# Patient Record
Sex: MALE | Race: BLACK OR AFRICAN AMERICAN | NOT HISPANIC OR LATINO | Employment: FULL TIME | URBAN - METROPOLITAN AREA
[De-identification: names, ages, dates, MRNs, and addresses within clinical notes are randomized per-mention and may not be internally consistent; named-entity substitution may affect disease eponyms.]

---

## 2023-10-25 ENCOUNTER — HOSPITAL ENCOUNTER (EMERGENCY)
Facility: HOSPITAL | Age: 33
Discharge: HOME | End: 2023-10-25
Attending: STUDENT IN AN ORGANIZED HEALTH CARE EDUCATION/TRAINING PROGRAM
Payer: MEDICAID

## 2023-10-25 VITALS
HEART RATE: 64 BPM | OXYGEN SATURATION: 100 % | SYSTOLIC BLOOD PRESSURE: 119 MMHG | RESPIRATION RATE: 18 BRPM | DIASTOLIC BLOOD PRESSURE: 79 MMHG | HEIGHT: 75 IN | WEIGHT: 162.94 LBS | BODY MASS INDEX: 20.26 KG/M2 | TEMPERATURE: 97 F

## 2023-10-25 DIAGNOSIS — R05.1 ACUTE COUGH: ICD-10-CM

## 2023-10-25 DIAGNOSIS — J10.1 INFLUENZA A: Primary | ICD-10-CM

## 2023-10-25 LAB
FLUAV AG UPPER RESP QL IA.RAPID: DETECTED
FLUBV AG UPPER RESP QL IA.RAPID: NOT DETECTED
SARS-COV-2 RNA RESP QL NAA+PROBE: NOT DETECTED

## 2023-10-25 PROCEDURE — 0240U COVID/FLU A&B PCR: CPT | Performed by: PHYSICIAN ASSISTANT

## 2023-10-25 PROCEDURE — 99284 EMERGENCY DEPT VISIT MOD MDM: CPT

## 2023-10-25 RX ORDER — AZELASTINE 1 MG/ML
1 SPRAY, METERED NASAL 2 TIMES DAILY
Qty: 30 ML | Refills: 0 | Status: SHIPPED | OUTPATIENT
Start: 2023-10-25 | End: 2023-11-01

## 2023-10-25 RX ORDER — CETIRIZINE HYDROCHLORIDE 10 MG/1
10 TABLET ORAL DAILY
Qty: 14 TABLET | Refills: 0 | Status: SHIPPED | OUTPATIENT
Start: 2023-10-25 | End: 2023-11-08

## 2023-10-25 RX ORDER — PROMETHAZINE HYDROCHLORIDE AND DEXTROMETHORPHAN HYDROBROMIDE 6.25; 15 MG/5ML; MG/5ML
5 SYRUP ORAL EVERY 6 HOURS PRN
Qty: 100 ML | Refills: 0 | Status: SHIPPED | OUTPATIENT
Start: 2023-10-25 | End: 2023-10-30

## 2023-10-25 NOTE — ED PROVIDER NOTES
Encounter Date: 10/25/2023       History     Chief Complaint   Patient presents with    Influenza     Reports known exposure to flu. Vomiting, cough, runny nose, and HA since this AM.     32-year-old male reports to the emergency department with complaints of cough, runny nose, headache, nausea, vomiting, and generalized body aches that began yesterday.  He states he was recently exposed to a few people that tested positive for influenza.  Patient states he is still able to eat and drink.  He denies dizziness, chest pain, shortness of breath, diarrhea, sore throat, ear pain.    The history is provided by the patient. No  was used.     Review of patient's allergies indicates:  No Known Allergies  No past medical history on file.  No past surgical history on file.  No family history on file.     Review of Systems   Constitutional:  Negative for appetite change and fever.   HENT:  Positive for congestion and rhinorrhea. Negative for ear pain and sore throat.    Eyes: Negative.    Respiratory:  Positive for cough. Negative for shortness of breath.    Cardiovascular:  Negative for chest pain.   Gastrointestinal:  Positive for nausea and vomiting. Negative for abdominal pain.   Genitourinary:  Negative for dysuria.   Musculoskeletal:  Negative for back pain.   Skin:  Negative for rash.   Neurological:  Positive for headaches.       Physical Exam     Initial Vitals [10/25/23 0924]   BP Pulse Resp Temp SpO2   119/79 64 18 97 °F (36.1 °C) 100 %      MAP       --         Physical Exam    Nursing note and vitals reviewed.  Constitutional: He appears well-developed and well-nourished.   HENT:   Nose: Nose normal.   Mouth/Throat: Oropharynx is clear and moist.   Eyes: Conjunctivae and EOM are normal. Pupils are equal, round, and reactive to light.   Neck: Neck supple.   Normal range of motion.  Cardiovascular:  Normal rate, regular rhythm, normal heart sounds and intact distal pulses.            Pulmonary/Chest: Breath sounds normal. No respiratory distress. He has no wheezes. He has no rhonchi. He has no rales. He exhibits no tenderness.   Abdominal: Abdomen is soft. Bowel sounds are normal. There is no abdominal tenderness.   Musculoskeletal:         General: Normal range of motion.      Cervical back: Normal range of motion and neck supple.     Lymphadenopathy:     He has no cervical adenopathy.   Neurological: He is alert and oriented to person, place, and time. GCS score is 15. GCS eye subscore is 4. GCS verbal subscore is 5. GCS motor subscore is 6.   Skin: Skin is warm. Capillary refill takes less than 2 seconds.         ED Course   Procedures  Labs Reviewed   COVID/FLU A&B PCR - Abnormal; Notable for the following components:       Result Value    Influenza A PCR Detected (*)     All other components within normal limits    Narrative:     The Xpert Xpress SARS-CoV-2/FLU/RSV plus is a rapid, multiplexed real-time PCR test intended for the simultaneous qualitative detection and differentiation of SARS-CoV-2, Influenza A, Influenza B, and respiratory syncytial virus (RSV) viral RNA in either nasopharyngeal swab or nasal swab specimens.                Imaging Results    None          Medications - No data to display  Medical Decision Making  32-year-old male reports to the emergency department with complaints of cough, runny nose, headache, nausea, vomiting, and generalized body aches that began yesterday.  He states he was recently exposed to a few people that tested positive for influenza.  Patient states he is still able to eat and drink.  He denies dizziness, chest pain, shortness of breath, diarrhea, sore throat, ear pain.    DDx:  COVID, influenza, viral illness, seasonal allergies    Patient tested positive for influenza A.  He declined Tamiflu.  I prescribed cough syrup, azelastine nasal spray and cetirizine.    The patient is resting comfortably and in no acute distress.  I personally  discussed his test results and treatment plan.  Gave strict ED precautions and specific conditions for return to the emergency department and importance of follow up with pcp.  Patient voices understanding and agrees to the plan discussed. All patients' questions have been answered at this time. He has remained hemodynamically stable throughout entire stay in ED and is stable for discharge home.     Amount and/or Complexity of Data Reviewed  Labs: ordered. Decision-making details documented in ED Course.     Details: Influenza A+    Risk  OTC drugs.  Prescription drug management.         APC / Resident Notes:   I was not physically present during the history, exam or disposition of this patient. I was available at all times for consultation. (Zmora)        ED Course as of 10/25/23 1943   Wed Oct 25, 2023   1016 Influenza A, Molecular(!): Detected [ER]      ED Course User Index  [ER] Maria Fernanda Del Real PA                    Clinical Impression:   Final diagnoses:  [J10.1] Influenza A (Primary)  [R05.1] Acute cough        ED Disposition Condition    Discharge Stable          ED Prescriptions       Medication Sig Dispense Start Date End Date Auth. Provider    azelastine (ASTELIN) 137 mcg (0.1 %) nasal spray 1 spray (137 mcg total) by Nasal route 2 (two) times daily. for 7 days 30 mL 10/25/2023 11/1/2023 Maria Fernanda Del Real PA    cetirizine (ZYRTEC) 10 MG tablet Take 1 tablet (10 mg total) by mouth once daily. for 14 days 14 tablet 10/25/2023 11/8/2023 Maria Fernanda Del Real PA    promethazine-dextromethorphan (PROMETHAZINE-DM) 6.25-15 mg/5 mL Syrp Take 5 mLs by mouth every 6 (six) hours as needed (cough). 100 mL 10/25/2023 10/30/2023 Maria Fernanda Del Real PA          Follow-up Information       Follow up With Specialties Details Why Contact Info    [facility] - Emergency Dept Emergency Medicine  As needed, If symptoms worsen [address] [facility] [address] [phone]             Maria Fernanda Del Real PA  10/25/23  1757       Moustapha Thornton MD  10/25/23 1943

## 2024-06-25 ENCOUNTER — HOSPITAL ENCOUNTER (OUTPATIENT)
Facility: HOSPITAL | Age: 34
Discharge: HOME | End: 2024-06-27
Attending: STUDENT IN AN ORGANIZED HEALTH CARE EDUCATION/TRAINING PROGRAM | Admitting: SURGERY
Payer: MEDICAID

## 2024-06-25 DIAGNOSIS — K35.30 ACUTE APPENDICITIS WITH LOCALIZED PERITONITIS, WITHOUT PERFORATION, ABSCESS, OR GANGRENE: Primary | ICD-10-CM

## 2024-06-25 LAB
ALBUMIN SERPL-MCNC: 4.6 G/DL (ref 3.5–5)
ALBUMIN/GLOB SERPL: 1.4 RATIO (ref 1.1–2)
ALP SERPL-CCNC: 66 UNIT/L (ref 40–150)
ALT SERPL-CCNC: 13 UNIT/L (ref 0–55)
ANION GAP SERPL CALC-SCNC: 13 MEQ/L
AST SERPL-CCNC: 25 UNIT/L (ref 5–34)
BACTERIA #/AREA URNS AUTO: ABNORMAL /HPF
BASOPHILS # BLD AUTO: 0.03 X10(3)/MCL
BASOPHILS NFR BLD AUTO: 0.2 %
BILIRUB SERPL-MCNC: 2.7 MG/DL
BILIRUB UR QL STRIP.AUTO: NEGATIVE
BUN SERPL-MCNC: 15.6 MG/DL (ref 8.9–20.6)
CALCIUM SERPL-MCNC: 10.5 MG/DL (ref 8.4–10.2)
CHLORIDE SERPL-SCNC: 99 MMOL/L (ref 98–107)
CK SERPL-CCNC: 192 U/L (ref 30–200)
CLARITY UR: CLEAR
CO2 SERPL-SCNC: 24 MMOL/L (ref 22–29)
COLOR UR AUTO: ABNORMAL
CREAT SERPL-MCNC: 1.15 MG/DL (ref 0.73–1.18)
CREAT/UREA NIT SERPL: 14
EOSINOPHIL # BLD AUTO: 0.01 X10(3)/MCL (ref 0–0.9)
EOSINOPHIL NFR BLD AUTO: 0.1 %
ERYTHROCYTE [DISTWIDTH] IN BLOOD BY AUTOMATED COUNT: 11 % (ref 11.5–17)
GFR SERPLBLD CREATININE-BSD FMLA CKD-EPI: >60 ML/MIN/1.73/M2
GLOBULIN SER-MCNC: 3.4 GM/DL (ref 2.4–3.5)
GLUCOSE SERPL-MCNC: 103 MG/DL (ref 74–100)
GLUCOSE UR QL STRIP: NORMAL
HCT VFR BLD AUTO: 43.1 % (ref 42–52)
HGB BLD-MCNC: 15.5 G/DL (ref 14–18)
HGB UR QL STRIP: NEGATIVE
HYALINE CASTS #/AREA URNS LPF: ABNORMAL /LPF
IMM GRANULOCYTES # BLD AUTO: 0.03 X10(3)/MCL (ref 0–0.04)
IMM GRANULOCYTES NFR BLD AUTO: 0.2 %
KETONES UR QL STRIP: ABNORMAL
LEUKOCYTE ESTERASE UR QL STRIP: 25
LIPASE SERPL-CCNC: 12 U/L
LYMPHOCYTES # BLD AUTO: 0.42 X10(3)/MCL (ref 0.6–4.6)
LYMPHOCYTES NFR BLD AUTO: 3.4 %
MCH RBC QN AUTO: 33.8 PG (ref 27–31)
MCHC RBC AUTO-ENTMCNC: 36 G/DL (ref 33–36)
MCV RBC AUTO: 94.1 FL (ref 80–94)
MONOCYTES # BLD AUTO: 0.35 X10(3)/MCL (ref 0.1–1.3)
MONOCYTES NFR BLD AUTO: 2.9 %
MUCOUS THREADS URNS QL MICRO: ABNORMAL /LPF
NEUTROPHILS # BLD AUTO: 11.4 X10(3)/MCL (ref 2.1–9.2)
NEUTROPHILS NFR BLD AUTO: 93.2 %
NITRITE UR QL STRIP: NEGATIVE
NRBC BLD AUTO-RTO: 0 %
PH UR STRIP: 6 [PH]
PLATELET # BLD AUTO: 192 X10(3)/MCL (ref 130–400)
PLATELETS.RETICULATED NFR BLD AUTO: 6.2 % (ref 0.9–11.2)
PMV BLD AUTO: 11.4 FL (ref 7.4–10.4)
POTASSIUM SERPL-SCNC: 3.5 MMOL/L (ref 3.5–5.1)
PROT SERPL-MCNC: 8 GM/DL (ref 6.4–8.3)
PROT UR QL STRIP: ABNORMAL
RBC # BLD AUTO: 4.58 X10(6)/MCL (ref 4.7–6.1)
RBC #/AREA URNS AUTO: ABNORMAL /HPF
SODIUM SERPL-SCNC: 136 MMOL/L (ref 136–145)
SP GR UR STRIP.AUTO: 1.04 (ref 1–1.03)
SQUAMOUS #/AREA URNS LPF: ABNORMAL /HPF
UROBILINOGEN UR STRIP-ACNC: ABNORMAL
WBC # BLD AUTO: 12.24 X10(3)/MCL (ref 4.5–11.5)
WBC #/AREA URNS AUTO: ABNORMAL /HPF

## 2024-06-25 PROCEDURE — G0378 HOSPITAL OBSERVATION PER HR: HCPCS

## 2024-06-25 PROCEDURE — 87086 URINE CULTURE/COLONY COUNT: CPT | Performed by: PHYSICIAN ASSISTANT

## 2024-06-25 PROCEDURE — 96376 TX/PRO/DX INJ SAME DRUG ADON: CPT

## 2024-06-25 PROCEDURE — 85025 COMPLETE CBC W/AUTO DIFF WBC: CPT | Performed by: PHYSICIAN ASSISTANT

## 2024-06-25 PROCEDURE — 25000003 PHARM REV CODE 250: Performed by: PHYSICIAN ASSISTANT

## 2024-06-25 PROCEDURE — 25500020 PHARM REV CODE 255: Performed by: PHYSICIAN ASSISTANT

## 2024-06-25 PROCEDURE — 63600175 PHARM REV CODE 636 W HCPCS: Performed by: STUDENT IN AN ORGANIZED HEALTH CARE EDUCATION/TRAINING PROGRAM

## 2024-06-25 PROCEDURE — 25000003 PHARM REV CODE 250

## 2024-06-25 PROCEDURE — 63600175 PHARM REV CODE 636 W HCPCS: Performed by: PHYSICIAN ASSISTANT

## 2024-06-25 PROCEDURE — 96365 THER/PROPH/DIAG IV INF INIT: CPT

## 2024-06-25 PROCEDURE — 96361 HYDRATE IV INFUSION ADD-ON: CPT

## 2024-06-25 PROCEDURE — 96366 THER/PROPH/DIAG IV INF ADDON: CPT

## 2024-06-25 PROCEDURE — 96375 TX/PRO/DX INJ NEW DRUG ADDON: CPT

## 2024-06-25 PROCEDURE — 80053 COMPREHEN METABOLIC PANEL: CPT | Performed by: PHYSICIAN ASSISTANT

## 2024-06-25 PROCEDURE — 83690 ASSAY OF LIPASE: CPT | Performed by: PHYSICIAN ASSISTANT

## 2024-06-25 PROCEDURE — 63600175 PHARM REV CODE 636 W HCPCS

## 2024-06-25 PROCEDURE — 96372 THER/PROPH/DIAG INJ SC/IM: CPT | Mod: 59

## 2024-06-25 PROCEDURE — 82550 ASSAY OF CK (CPK): CPT | Performed by: PHYSICIAN ASSISTANT

## 2024-06-25 PROCEDURE — 99285 EMERGENCY DEPT VISIT HI MDM: CPT | Mod: 25

## 2024-06-25 PROCEDURE — 81015 MICROSCOPIC EXAM OF URINE: CPT | Performed by: PHYSICIAN ASSISTANT

## 2024-06-25 PROCEDURE — 81001 URINALYSIS AUTO W/SCOPE: CPT | Performed by: PHYSICIAN ASSISTANT

## 2024-06-25 RX ORDER — SODIUM CHLORIDE, SODIUM LACTATE, POTASSIUM CHLORIDE, CALCIUM CHLORIDE 600; 310; 30; 20 MG/100ML; MG/100ML; MG/100ML; MG/100ML
INJECTION, SOLUTION INTRAVENOUS CONTINUOUS
Status: DISCONTINUED | OUTPATIENT
Start: 2024-06-25 | End: 2024-06-27

## 2024-06-25 RX ORDER — ACETAMINOPHEN 325 MG/1
650 TABLET ORAL EVERY 8 HOURS PRN
Status: DISCONTINUED | OUTPATIENT
Start: 2024-06-25 | End: 2024-06-27 | Stop reason: HOSPADM

## 2024-06-25 RX ORDER — HYDRALAZINE HYDROCHLORIDE 20 MG/ML
10 INJECTION INTRAMUSCULAR; INTRAVENOUS EVERY 6 HOURS PRN
Status: DISCONTINUED | OUTPATIENT
Start: 2024-06-25 | End: 2024-06-27 | Stop reason: HOSPADM

## 2024-06-25 RX ORDER — ONDANSETRON HYDROCHLORIDE 2 MG/ML
8 INJECTION, SOLUTION INTRAVENOUS EVERY 8 HOURS PRN
Status: DISCONTINUED | OUTPATIENT
Start: 2024-06-25 | End: 2024-06-27 | Stop reason: HOSPADM

## 2024-06-25 RX ORDER — OXYCODONE HYDROCHLORIDE 5 MG/1
5 TABLET ORAL EVERY 4 HOURS PRN
Status: DISCONTINUED | OUTPATIENT
Start: 2024-06-25 | End: 2024-06-27 | Stop reason: HOSPADM

## 2024-06-25 RX ORDER — IBUPROFEN 200 MG
1 TABLET ORAL DAILY PRN
Status: DISCONTINUED | OUTPATIENT
Start: 2024-06-25 | End: 2024-06-27 | Stop reason: HOSPADM

## 2024-06-25 RX ORDER — OXYCODONE HYDROCHLORIDE 5 MG/1
10 TABLET ORAL EVERY 4 HOURS PRN
Status: DISCONTINUED | OUTPATIENT
Start: 2024-06-25 | End: 2024-06-27 | Stop reason: HOSPADM

## 2024-06-25 RX ORDER — LIDOCAINE HYDROCHLORIDE 10 MG/ML
1 INJECTION, SOLUTION EPIDURAL; INFILTRATION; INTRACAUDAL; PERINEURAL ONCE
Status: DISCONTINUED | OUTPATIENT
Start: 2024-06-25 | End: 2024-06-27 | Stop reason: HOSPADM

## 2024-06-25 RX ORDER — TALC
6 POWDER (GRAM) TOPICAL NIGHTLY PRN
Status: DISCONTINUED | OUTPATIENT
Start: 2024-06-25 | End: 2024-06-27 | Stop reason: HOSPADM

## 2024-06-25 RX ORDER — ACETAMINOPHEN 325 MG/1
650 TABLET ORAL EVERY 6 HOURS
Status: DISCONTINUED | OUTPATIENT
Start: 2024-06-25 | End: 2024-06-27

## 2024-06-25 RX ORDER — ENOXAPARIN SODIUM 100 MG/ML
40 INJECTION SUBCUTANEOUS EVERY 24 HOURS
Status: DISCONTINUED | OUTPATIENT
Start: 2024-06-25 | End: 2024-06-27 | Stop reason: HOSPADM

## 2024-06-25 RX ORDER — ONDANSETRON HYDROCHLORIDE 2 MG/ML
4 INJECTION, SOLUTION INTRAVENOUS
Status: COMPLETED | OUTPATIENT
Start: 2024-06-25 | End: 2024-06-25

## 2024-06-25 RX ORDER — KETOROLAC TROMETHAMINE 30 MG/ML
15 INJECTION, SOLUTION INTRAMUSCULAR; INTRAVENOUS
Status: COMPLETED | OUTPATIENT
Start: 2024-06-25 | End: 2024-06-25

## 2024-06-25 RX ADMIN — PIPERACILLIN AND TAZOBACTAM 4.5 G: 4; .5 INJECTION, POWDER, LYOPHILIZED, FOR SOLUTION INTRAVENOUS; PARENTERAL at 08:06

## 2024-06-25 RX ADMIN — ONDANSETRON 8 MG: 2 INJECTION INTRAMUSCULAR; INTRAVENOUS at 10:06

## 2024-06-25 RX ADMIN — SODIUM CHLORIDE, POTASSIUM CHLORIDE, SODIUM LACTATE AND CALCIUM CHLORIDE: 600; 310; 30; 20 INJECTION, SOLUTION INTRAVENOUS at 06:06

## 2024-06-25 RX ADMIN — ENOXAPARIN SODIUM 40 MG: 40 INJECTION SUBCUTANEOUS at 08:06

## 2024-06-25 RX ADMIN — SODIUM CHLORIDE, POTASSIUM CHLORIDE, SODIUM LACTATE AND CALCIUM CHLORIDE 1000 ML: 600; 310; 30; 20 INJECTION, SOLUTION INTRAVENOUS at 11:06

## 2024-06-25 RX ADMIN — PIPERACILLIN SODIUM AND TAZOBACTAM SODIUM 4.5 G: 4; .5 INJECTION, POWDER, LYOPHILIZED, FOR SOLUTION INTRAVENOUS at 04:06

## 2024-06-25 RX ADMIN — ACETAMINOPHEN 650 MG: 325 TABLET, FILM COATED ORAL at 06:06

## 2024-06-25 RX ADMIN — IOHEXOL 100 ML: 350 INJECTION, SOLUTION INTRAVENOUS at 03:06

## 2024-06-25 RX ADMIN — ONDANSETRON 4 MG: 2 INJECTION INTRAMUSCULAR; INTRAVENOUS at 11:06

## 2024-06-25 RX ADMIN — KETOROLAC TROMETHAMINE 15 MG: 30 INJECTION, SOLUTION INTRAMUSCULAR; INTRAVENOUS at 04:06

## 2024-06-25 NOTE — ED PROVIDER NOTES
Encounter Date: 6/25/2024       History     Chief Complaint   Patient presents with    Abdominal Pain     PT W CO ABD PAIN W NV AND CONSTIPATION SINCE YESTERDAY AFTER MOWING GRASS.  PT ACTIVELY VOMITING IN TRIAGE.       34 yo male presents to to ED with N/V, abdominal pain, and constipation for the past 3 days. Patient states it worsened after mowing the grass yesterday. Patient states he has vomited over 8 times. Abdominal pain is generalized, but mostly epigastric. Admits marijuana usage, last use yesterday. Denies PMHx. Patient states the pain is constant and sharp. Last BM 3 days ago.     The history is provided by the patient. No  was used.     Review of patient's allergies indicates:  No Known Allergies  History reviewed. No pertinent past medical history.  History reviewed. No pertinent surgical history.  No family history on file.  Social History     Tobacco Use    Smoking status: Every Day     Types: Cigarettes    Smokeless tobacco: Never   Substance Use Topics    Alcohol use: Yes    Drug use: Yes     Types: Marijuana     Review of Systems   Constitutional:  Negative for chills, fatigue and fever.   HENT:  Negative for congestion, ear pain, sinus pain and sore throat.    Eyes:  Negative for pain.   Respiratory:  Negative for cough, chest tightness and shortness of breath.    Cardiovascular:  Negative for chest pain.   Gastrointestinal:  Positive for abdominal pain, constipation, nausea and vomiting. Negative for abdominal distention, blood in stool and diarrhea.   Genitourinary:  Negative for dysuria.   Musculoskeletal:  Negative for back pain and joint swelling.   Skin:  Negative for color change and rash.   Neurological:  Negative for dizziness and weakness.   Psychiatric/Behavioral:  Negative for behavioral problems and confusion.    All other systems reviewed and are negative.      Physical Exam     Initial Vitals [06/25/24 1058]   BP Pulse Resp Temp SpO2   126/60 102 (!) 22 99.5 °F  (37.5 °C) 100 %      MAP       --         Physical Exam    Nursing note and vitals reviewed.  Constitutional: He appears well-developed and well-nourished.   HENT:   Head: Normocephalic and atraumatic.   Right Ear: External ear normal.   Left Ear: External ear normal.   Nose: Nose normal.   Eyes: Conjunctivae and EOM are normal.   Neck: Neck supple. No thyromegaly present. No tracheal deviation present.   Cardiovascular:  Normal rate, regular rhythm and normal heart sounds.     Exam reveals no gallop and no friction rub.       No murmur heard.  Pulmonary/Chest: Breath sounds normal. No respiratory distress. He has no wheezes. He has no rhonchi. He has no rales. He exhibits no tenderness.   Abdominal: Abdomen is soft. Bowel sounds are normal. He exhibits no distension and no mass. There is abdominal tenderness (epigastric). There is no rebound and no guarding.   Musculoskeletal:      Cervical back: Neck supple.     Neurological: He is alert and oriented to person, place, and time. GCS score is 15. GCS eye subscore is 4. GCS verbal subscore is 5. GCS motor subscore is 6.   Skin: Skin is warm. Capillary refill takes less than 2 seconds. No rash and no abscess noted. No erythema. No pallor.   Psychiatric: He has a normal mood and affect.         ED Course   Procedures  Labs Reviewed   COMPREHENSIVE METABOLIC PANEL - Abnormal; Notable for the following components:       Result Value    Glucose 103 (*)     Calcium 10.5 (*)     Bilirubin Total 2.7 (*)     All other components within normal limits   URINALYSIS, REFLEX TO URINE CULTURE - Abnormal; Notable for the following components:    Color, UA Light-Orange (*)     Specific Gravity, UA 1.042 (*)     Protein, UA 1+ (*)     Ketones, UA 3+ (*)     Urobilinogen, UA 3+ (*)     Leukocyte Esterase, UA 25 (*)     WBC, UA 11-20 (*)     Bacteria, UA Trace (*)     Mucous, UA Few (*)     All other components within normal limits   CBC WITH DIFFERENTIAL - Abnormal; Notable for the  following components:    WBC 12.24 (*)     RBC 4.58 (*)     MCV 94.1 (*)     MCH 33.8 (*)     RDW 11.0 (*)     MPV 11.4 (*)     Lymph # 0.42 (*)     Neut # 11.40 (*)     All other components within normal limits   CK - Normal   LIPASE - Normal   CULTURE, URINE   CBC W/ AUTO DIFFERENTIAL    Narrative:     The following orders were created for panel order CBC auto differential.  Procedure                               Abnormality         Status                     ---------                               -----------         ------                     CBC with Differential[[id number]]       Abnormal            Final result                 Please view results for these tests on the individual orders.          Imaging Results              CT Abdomen Pelvis With IV Contrast NO Oral Contrast (Final result)  Result time 06/25/24 15:16:33      Final result by Jerilyn Arellano MD (06/25/24 15:16:33)                   Impression:      Findings suggestive of early acute appendicitis.  No drainable fluid collection or free air.      Electronically signed by: Jerilyn Arellano  Date:    06/25/2024  Time:    15:16               Narrative:    EXAMINATION:  CT ABDOMEN PELVIS WITH IV CONTRAST    CLINICAL HISTORY:  RLQ abdominal pain (Age >= 14y);    TECHNIQUE:  CT imaging was performed of the abdomen and pelvis after the administration of intravenous contrast. Dose length product is 425 mGycm. Automatic exposure control, adjustment of mA/kV or iterative reconstruction technique was used to limit radiation dose.    COMPARISON:  None    FINDINGS:  Liver: Normal.    Gallbladder and biliary tree: No calcified gallstones. No intra or extrahepatic biliary ductal dilation.    Pancreas: Normal.    Spleen: Normal.    Adrenals: Normal.    Kidneys and ureters: Normal.    Bladder: Normal.    Reproductive organs: No pelvic masses.    Stomach/bowel: No evidence of bowel obstruction. The appendix is dilated measuring up to 9 mm in diameter, with  mild surrounding inflammatory changes.    Lymph nodes: No pathologically enlarged lymph node identified.    Peritoneum: No ascites or free air. No fluid collection.    Vessels: No abdominal aortic aneurysm.    Abdominal wall: Normal.    Lung bases: No consolidation or pleural effusion.    Bones: No acute osseous findings.                                       X-Ray Abdomen Flat And Erect (Final result)  Result time 06/25/24 12:11:56      Final result by Po Ramey MD (06/25/24 12:11:56)                   Impression:      No acute radiographic findings.      Electronically signed by: Po Ramey  Date:    06/25/2024  Time:    12:11               Narrative:    EXAMINATION:  XR ABDOMEN FLAT AND ERECT    CLINICAL HISTORY:  abdominal pain, constipation;    COMPARISON:  None    FINDINGS:  Flat and upright views of the abdomen.  There is a nonspecific, nonobstructive bowel gas pattern.  No large stool burden no free air.                                       Medications   piperacillin-tazobactam (ZOSYN) 4.5 g in D5W 100 mL IVPB (MB+) (has no administration in time range)   ketorolac injection 15 mg (has no administration in time range)   lactated ringers bolus 1,000 mL (0 mLs Intravenous Stopped 6/25/24 1238)   ondansetron injection 4 mg (4 mg Intravenous Given 6/25/24 1137)   iohexoL (OMNIPAQUE 350) injection 100 mL (100 mLs Intravenous Given 6/25/24 1506)     Medical Decision Making  34 yo male presents to to ED with N/V, abdominal pain, and constipation for the past 3 days. Patient states it worsened after mowing the grass yesterday. Patient states he has vomited over 8 times. Abdominal pain is generalized, but mostly epigastric. Admits marijuana usage, last use yesterday. Denies PMHx. Patient states the pain is constant and sharp. Last BM 3 days ago.     DDX: constipation, bowel obstruction, pancreatitis, hyperemesis cannabis, among others    Hospital course: Labs, abdominal x-ray, and UA ordered. Zofran and  fluids given in ED. CBC remarkable for elevated wbc of 12. Will order CT abdomen/pelvis to rule out appendicitis. CMP unremarkable, lipase wnl. UA suggests dehydration, fluids given in ED. CT with early acute appendicitis. Zosyn IV given along with Zofran and Toradol. Discussed this case with Dr. Longo. Will admit to general surgery.       Amount and/or Complexity of Data Reviewed  Labs: ordered. Decision-making details documented in ED Course.  Radiology: ordered.  Discussion of management or test interpretation with external provider(s): Discussed case with Dr. Mendoza (general surgery) who will come to the ED for evaluation and admission.     Risk  Prescription drug management.               ED Course as of 06/25/24 1533   Tue Jun 25, 2024   1314 BILIRUBIN TOTAL(!): 2.7 [VH]   1533 WBC(!): 12.24 [VH]      ED Course User Index  [VH] Era Sawant PA-C                           Clinical Impression:  Final diagnoses:  [K35.30] Acute appendicitis with localized peritonitis, without perforation, abscess, or gangrene (Primary)          ED Disposition Condition    Admit Stable                Era Sawant PA-C  06/25/24 1533

## 2024-06-25 NOTE — H&P
[facility] Team  Admission H&P     CC: Abdominal pain     Subjective:  HPI: Patient is a 33M with no significant PMH who presents today with abdominal pain present for the last 3-4 days. Associated with subjective fevers, nausea, vomiting, and constipation. Denies ever having pain like this before. States the pain is primarily located periumbilical/RLQ, but has migrated to primarily involve the RLQ over the past day or so. Last BM was yesterday morning, which he reports as being small. Having darker urine than normal but attributes this to not being able to tolerate PO hydration. On arrival to ED, he is afebrile and hemodynamically stable with normal HR and BP. Labs notable for leukocytosis of 12.2 and UA with findings consistent with mild UTI. CT abdomen/pelvis significant for dilation of the appendix with surrounding inflammatory changes, concerning for acute early appendicitis. General surgery consulted for evaluation.     Approximately 1/2 ppd daily for the past 5 years. Drinks socially. Endorses marijuana occasionally.     ROS negative other than those noted in the HPI above.     PMH:  History reviewed. No pertinent past medical history.      PSH:  History reviewed. No pertinent surgical history. Reports umbilical hernia repair when he was younger than 1 years of age but surgeries since.      Medications:  No current facility-administered medications on file prior to encounter.     Current Outpatient Medications on File Prior to Encounter   Medication Sig Dispense Refill    azelastine (ASTELIN) 137 mcg (0.1 %) nasal spray 1 spray (137 mcg total) by Nasal route 2 (two) times daily. for 7 days 30 mL 0    cetirizine (ZYRTEC) 10 MG tablet Take 1 tablet (10 mg total) by mouth once daily. for 14 days 14 tablet 0      Allergies:  Review of patient's allergies indicates:  No Known Allergies      Social Hx:  Social History     Tobacco Use   Smoking Status Every Day    Types: Cigarettes   Smokeless  "Tobacco Never      Social History     Substance and Sexual Activity   Alcohol Use Yes      Relevant Family Hx:  No family history on file.      Objective:  Vitals:  VITAL SIGNS: 24 HR MIN & MAX LAST   Temp  Min: 99.5 °F (37.5 °C)  Max: 99.5 °F (37.5 °C)  99.5 °F (37.5 °C)   BP  Min: 126/60  Max: 142/80  (!) 142/80    Pulse  Min: 78  Max: 102  78    Resp  Min: 22  Max: 22  (!) 22    SpO2  Min: 97 %  Max: 100 %  99 %      HT:    WT: 73.4 kg (161 lb 13.1 oz)  BMI:        Physical Exam:  Gen: NAD  Neuro: awake, alert, answering questions appropriately  CV: RR on RP  Resp: non-labored breathing, JOE  Abd: soft, ND. Significant TTP to RLQ and periumbilical region. No rebound or guarding. Positive McBurney's sign.   : deferred  Ext: moves all 4 spontaneously and purposefully  Skin: warm, well perfused     Labs:  Renal:  Recent Labs     06/25/24  1210   BUN 15.6   CREATININE 1.15     No results for input(s): "LACTIC" in the last 72 hours.  FENGI:  Recent Labs     06/25/24  1210      K 3.5   CL 99   CO2 24   CALCIUM 10.5*   ALBUMIN 4.6   BILITOT 2.7*   AST 25   ALKPHOS 66   ALT 13     Heme:  Recent Labs     06/25/24  1210   HGB 15.5   HCT 43.1        ID:  Recent Labs     06/25/24  1210   WBC 12.24*     CBG:  Recent Labs     06/25/24  1210   GLUCOSE 103*      Cardiovascular:  No results for input(s): "TROPONINI", "CKTOTAL", "CKMB", "BNP" in the last 168 hours.  I have reviewed all pertinent lab results within the past 24 hours.     Imaging:  CT Abdomen/Pelvis 6/25:  Impression:  The appendix is dilated measuring up to 9 mm in diameter, with mild surrounding inflammatory changes, findings suggestive of early acute appendicitis. No drainable fluid collection or free air.     Assessment/Plan:   Patient is a 33M with no significant PMH who presents today with abdominal pain present for the last 3-4 days associated with subjective fevers, nausea, vomiting, and constipation. Clinical and radiographic findings " consistent with early acute appendicitis.     - Admit to general surgery service  - OR 6/26 for laparoscopic appendectomy. After discussing risks, benefits, and alternatives to surgery, he elects to proceed. All questions and concerns addressed. Consent obtained.   - Okay for CLD today. NPO with mIVF at midnight. Okay for sips with medications.   - Zosyn  - Scheduled tylenol; oxycodone PRN. Will broaden regimen if needed.   - Antiemetics PRN  - Encourage ambulation/OOB  - Lovenox for DVT ppx    Please call surgery with any acute changes in clinical exam and/or hemodynamic status.     Becky Mendoza, DO  [facility], PGY1  06/25/2024 5:02 PM

## 2024-06-25 NOTE — LETTER
June 27, 2024         [address] [facility] [address]  Phone: [phone]  Fax: [phone]       Patient: Brian Peacock   YOB: 1990  Date of Visit: 06/27/2024    To Whom It May Concern:    Noel Peacock  was at [facility] from 6/25/2024 to 06/27/2024. The patient may return to work on 07/08/2024 with restrictions of no heavy lifting greater than 10 lbs for 6 weeks and mandatory breaks as needed.  The patient may return to work on 08/05/2024 with no restrictions. If you have any questions or concerns, or if I can be of further assistance, please do not hesitate to contact me.    Sincerely,    Janett Zavala RN

## 2024-06-26 ENCOUNTER — ANESTHESIA EVENT (OUTPATIENT)
Dept: SURGERY | Facility: HOSPITAL | Age: 34
End: 2024-06-26
Payer: MEDICAID

## 2024-06-26 ENCOUNTER — ANESTHESIA (OUTPATIENT)
Dept: SURGERY | Facility: HOSPITAL | Age: 34
End: 2024-06-26
Payer: MEDICAID

## 2024-06-26 LAB
ALBUMIN SERPL-MCNC: 3.4 G/DL (ref 3.5–5)
ALBUMIN/GLOB SERPL: 1.2 RATIO (ref 1.1–2)
ALP SERPL-CCNC: 68 UNIT/L (ref 40–150)
ALT SERPL-CCNC: 96 UNIT/L (ref 0–55)
ANION GAP SERPL CALC-SCNC: 5 MEQ/L
AST SERPL-CCNC: 141 UNIT/L (ref 5–34)
BASOPHILS # BLD AUTO: 0.01 X10(3)/MCL
BASOPHILS NFR BLD AUTO: 0.1 %
BILIRUB SERPL-MCNC: 1.4 MG/DL
BUN SERPL-MCNC: 12.7 MG/DL (ref 8.9–20.6)
CALCIUM SERPL-MCNC: 9.3 MG/DL (ref 8.4–10.2)
CHLORIDE SERPL-SCNC: 101 MMOL/L (ref 98–107)
CO2 SERPL-SCNC: 28 MMOL/L (ref 22–29)
CREAT SERPL-MCNC: 1.14 MG/DL (ref 0.73–1.18)
CREAT/UREA NIT SERPL: 11
EOSINOPHIL # BLD AUTO: 0 X10(3)/MCL (ref 0–0.9)
EOSINOPHIL NFR BLD AUTO: 0 %
ERYTHROCYTE [DISTWIDTH] IN BLOOD BY AUTOMATED COUNT: 11 % (ref 11.5–17)
GFR SERPLBLD CREATININE-BSD FMLA CKD-EPI: >60 ML/MIN/1.73/M2
GLOBULIN SER-MCNC: 2.8 GM/DL (ref 2.4–3.5)
GLUCOSE SERPL-MCNC: 120 MG/DL (ref 74–100)
HCT VFR BLD AUTO: 37.9 % (ref 42–52)
HGB BLD-MCNC: 13.5 G/DL (ref 14–18)
HOLD SPECIMEN: NORMAL
IMM GRANULOCYTES # BLD AUTO: 0.02 X10(3)/MCL (ref 0–0.04)
IMM GRANULOCYTES NFR BLD AUTO: 0.3 %
LYMPHOCYTES # BLD AUTO: 0.29 X10(3)/MCL (ref 0.6–4.6)
LYMPHOCYTES NFR BLD AUTO: 3.8 %
MAGNESIUM SERPL-MCNC: 1.7 MG/DL (ref 1.6–2.6)
MCH RBC QN AUTO: 34.3 PG (ref 27–31)
MCHC RBC AUTO-ENTMCNC: 35.6 G/DL (ref 33–36)
MCV RBC AUTO: 96.2 FL (ref 80–94)
MONOCYTES # BLD AUTO: 0.38 X10(3)/MCL (ref 0.1–1.3)
MONOCYTES NFR BLD AUTO: 4.9 %
NEUTROPHILS # BLD AUTO: 7.02 X10(3)/MCL (ref 2.1–9.2)
NEUTROPHILS NFR BLD AUTO: 90.9 %
NRBC BLD AUTO-RTO: 0 %
PHOSPHATE SERPL-MCNC: 3.1 MG/DL (ref 2.3–4.7)
PLATELET # BLD AUTO: 169 X10(3)/MCL (ref 130–400)
PMV BLD AUTO: 11.5 FL (ref 7.4–10.4)
POTASSIUM SERPL-SCNC: 3.5 MMOL/L (ref 3.5–5.1)
PROT SERPL-MCNC: 6.2 GM/DL (ref 6.4–8.3)
RBC # BLD AUTO: 3.94 X10(6)/MCL (ref 4.7–6.1)
SODIUM SERPL-SCNC: 134 MMOL/L (ref 136–145)
WBC # BLD AUTO: 7.72 X10(3)/MCL (ref 4.5–11.5)

## 2024-06-26 PROCEDURE — 25000003 PHARM REV CODE 250

## 2024-06-26 PROCEDURE — 71000033 HC RECOVERY, INTIAL HOUR: Performed by: STUDENT IN AN ORGANIZED HEALTH CARE EDUCATION/TRAINING PROGRAM

## 2024-06-26 PROCEDURE — 63600175 PHARM REV CODE 636 W HCPCS

## 2024-06-26 PROCEDURE — 96375 TX/PRO/DX INJ NEW DRUG ADDON: CPT

## 2024-06-26 PROCEDURE — 84100 ASSAY OF PHOSPHORUS: CPT

## 2024-06-26 PROCEDURE — 88304 TISSUE EXAM BY PATHOLOGIST: CPT | Mod: TC | Performed by: STUDENT IN AN ORGANIZED HEALTH CARE EDUCATION/TRAINING PROGRAM

## 2024-06-26 PROCEDURE — 37000009 HC ANESTHESIA EA ADD 15 MINS: Performed by: STUDENT IN AN ORGANIZED HEALTH CARE EDUCATION/TRAINING PROGRAM

## 2024-06-26 PROCEDURE — 36000709 HC OR TIME LEV III EA ADD 15 MIN: Performed by: STUDENT IN AN ORGANIZED HEALTH CARE EDUCATION/TRAINING PROGRAM

## 2024-06-26 PROCEDURE — 63600175 PHARM REV CODE 636 W HCPCS: Performed by: ANESTHESIOLOGY

## 2024-06-26 PROCEDURE — 94761 N-INVAS EAR/PLS OXIMETRY MLT: CPT

## 2024-06-26 PROCEDURE — 96361 HYDRATE IV INFUSION ADD-ON: CPT

## 2024-06-26 PROCEDURE — 36415 COLL VENOUS BLD VENIPUNCTURE: CPT

## 2024-06-26 PROCEDURE — 96372 THER/PROPH/DIAG INJ SC/IM: CPT

## 2024-06-26 PROCEDURE — 83735 ASSAY OF MAGNESIUM: CPT

## 2024-06-26 PROCEDURE — 85025 COMPLETE CBC W/AUTO DIFF WBC: CPT

## 2024-06-26 PROCEDURE — 25000003 PHARM REV CODE 250: Performed by: STUDENT IN AN ORGANIZED HEALTH CARE EDUCATION/TRAINING PROGRAM

## 2024-06-26 PROCEDURE — 37000008 HC ANESTHESIA 1ST 15 MINUTES: Performed by: STUDENT IN AN ORGANIZED HEALTH CARE EDUCATION/TRAINING PROGRAM

## 2024-06-26 PROCEDURE — 25000003 PHARM REV CODE 250: Performed by: NURSE ANESTHETIST, CERTIFIED REGISTERED

## 2024-06-26 PROCEDURE — G0378 HOSPITAL OBSERVATION PER HR: HCPCS

## 2024-06-26 PROCEDURE — 36000708 HC OR TIME LEV III 1ST 15 MIN: Performed by: STUDENT IN AN ORGANIZED HEALTH CARE EDUCATION/TRAINING PROGRAM

## 2024-06-26 PROCEDURE — 96366 THER/PROPH/DIAG IV INF ADDON: CPT

## 2024-06-26 PROCEDURE — 80053 COMPREHEN METABOLIC PANEL: CPT

## 2024-06-26 PROCEDURE — 63600175 PHARM REV CODE 636 W HCPCS: Performed by: NURSE ANESTHETIST, CERTIFIED REGISTERED

## 2024-06-26 PROCEDURE — 27201423 OPTIME MED/SURG SUP & DEVICES STERILE SUPPLY: Performed by: STUDENT IN AN ORGANIZED HEALTH CARE EDUCATION/TRAINING PROGRAM

## 2024-06-26 RX ORDER — SODIUM CHLORIDE, SODIUM LACTATE, POTASSIUM CHLORIDE, CALCIUM CHLORIDE 600; 310; 30; 20 MG/100ML; MG/100ML; MG/100ML; MG/100ML
INJECTION, SOLUTION INTRAVENOUS CONTINUOUS
Status: DISCONTINUED | OUTPATIENT
Start: 2024-06-26 | End: 2024-06-27

## 2024-06-26 RX ORDER — MIDAZOLAM HYDROCHLORIDE 2 MG/2ML
2 INJECTION, SOLUTION INTRAMUSCULAR; INTRAVENOUS ONCE AS NEEDED
Status: COMPLETED | OUTPATIENT
Start: 2024-06-26 | End: 2024-06-26

## 2024-06-26 RX ORDER — OXYCODONE HYDROCHLORIDE 5 MG/1
5 TABLET ORAL
Status: DISCONTINUED | OUTPATIENT
Start: 2024-06-26 | End: 2024-06-26

## 2024-06-26 RX ORDER — DEXMEDETOMIDINE HYDROCHLORIDE 100 UG/ML
INJECTION, SOLUTION INTRAVENOUS
Status: DISCONTINUED | OUTPATIENT
Start: 2024-06-26 | End: 2024-06-26

## 2024-06-26 RX ORDER — CEFAZOLIN SODIUM 1 G/3ML
INJECTION, POWDER, FOR SOLUTION INTRAMUSCULAR; INTRAVENOUS
Status: DISCONTINUED | OUTPATIENT
Start: 2024-06-26 | End: 2024-06-26

## 2024-06-26 RX ORDER — ROCURONIUM BROMIDE 10 MG/ML
INJECTION, SOLUTION INTRAVENOUS
Status: DISCONTINUED | OUTPATIENT
Start: 2024-06-26 | End: 2024-06-26

## 2024-06-26 RX ORDER — DEXAMETHASONE SODIUM PHOSPHATE 4 MG/ML
INJECTION, SOLUTION INTRA-ARTICULAR; INTRALESIONAL; INTRAMUSCULAR; INTRAVENOUS; SOFT TISSUE
Status: DISCONTINUED | OUTPATIENT
Start: 2024-06-26 | End: 2024-06-26

## 2024-06-26 RX ORDER — MORPHINE SULFATE 2 MG/ML
2 INJECTION, SOLUTION INTRAMUSCULAR; INTRAVENOUS EVERY 5 MIN PRN
Status: DISCONTINUED | OUTPATIENT
Start: 2024-06-26 | End: 2024-06-26

## 2024-06-26 RX ORDER — LIDOCAINE HYDROCHLORIDE 20 MG/ML
INJECTION INTRAVENOUS
Status: DISCONTINUED | OUTPATIENT
Start: 2024-06-26 | End: 2024-06-26

## 2024-06-26 RX ORDER — FENTANYL CITRATE 50 UG/ML
INJECTION, SOLUTION INTRAMUSCULAR; INTRAVENOUS
Status: DISCONTINUED | OUTPATIENT
Start: 2024-06-26 | End: 2024-06-26

## 2024-06-26 RX ORDER — KETAMINE HCL IN 0.9 % NACL 50 MG/5 ML
SYRINGE (ML) INTRAVENOUS
Status: DISCONTINUED | OUTPATIENT
Start: 2024-06-26 | End: 2024-06-26

## 2024-06-26 RX ORDER — SODIUM CHLORIDE 0.9 % (FLUSH) 0.9 %
10 SYRINGE (ML) INJECTION
Status: DISCONTINUED | OUTPATIENT
Start: 2024-06-26 | End: 2024-06-27 | Stop reason: HOSPADM

## 2024-06-26 RX ORDER — ONDANSETRON HYDROCHLORIDE 2 MG/ML
4 INJECTION, SOLUTION INTRAVENOUS DAILY PRN
Status: DISCONTINUED | OUTPATIENT
Start: 2024-06-26 | End: 2024-06-26

## 2024-06-26 RX ORDER — KETOROLAC TROMETHAMINE 30 MG/ML
INJECTION, SOLUTION INTRAMUSCULAR; INTRAVENOUS
Status: DISCONTINUED | OUTPATIENT
Start: 2024-06-26 | End: 2024-06-26

## 2024-06-26 RX ORDER — ONDANSETRON HYDROCHLORIDE 2 MG/ML
INJECTION, SOLUTION INTRAVENOUS
Status: DISCONTINUED | OUTPATIENT
Start: 2024-06-26 | End: 2024-06-26

## 2024-06-26 RX ORDER — MEPERIDINE HYDROCHLORIDE 25 MG/ML
12.5 INJECTION INTRAMUSCULAR; INTRAVENOUS; SUBCUTANEOUS EVERY 10 MIN PRN
Status: DISCONTINUED | OUTPATIENT
Start: 2024-06-26 | End: 2024-06-26

## 2024-06-26 RX ORDER — PROPOFOL 10 MG/ML
VIAL (ML) INTRAVENOUS
Status: DISCONTINUED | OUTPATIENT
Start: 2024-06-26 | End: 2024-06-26

## 2024-06-26 RX ADMIN — SODIUM CHLORIDE, POTASSIUM CHLORIDE, SODIUM LACTATE AND CALCIUM CHLORIDE: 600; 310; 30; 20 INJECTION, SOLUTION INTRAVENOUS at 02:06

## 2024-06-26 RX ADMIN — Medication 20 MG: at 03:06

## 2024-06-26 RX ADMIN — PIPERACILLIN AND TAZOBACTAM 4.5 G: 4; .5 INJECTION, POWDER, LYOPHILIZED, FOR SOLUTION INTRAVENOUS; PARENTERAL at 04:06

## 2024-06-26 RX ADMIN — FENTANYL CITRATE 100 MCG: 50 INJECTION INTRAMUSCULAR; INTRAVENOUS at 02:06

## 2024-06-26 RX ADMIN — MIDAZOLAM HYDROCHLORIDE 2 MG: 1 INJECTION, SOLUTION INTRAMUSCULAR; INTRAVENOUS at 01:06

## 2024-06-26 RX ADMIN — ENOXAPARIN SODIUM 40 MG: 40 INJECTION SUBCUTANEOUS at 05:06

## 2024-06-26 RX ADMIN — OXYCODONE HYDROCHLORIDE 10 MG: 5 TABLET ORAL at 12:06

## 2024-06-26 RX ADMIN — ACETAMINOPHEN 650 MG: 325 TABLET, FILM COATED ORAL at 06:06

## 2024-06-26 RX ADMIN — SODIUM CHLORIDE, POTASSIUM CHLORIDE, SODIUM LACTATE AND CALCIUM CHLORIDE: 600; 310; 30; 20 INJECTION, SOLUTION INTRAVENOUS at 04:06

## 2024-06-26 RX ADMIN — LIDOCAINE HYDROCHLORIDE 75 MG: 20 INJECTION INTRAVENOUS at 02:06

## 2024-06-26 RX ADMIN — ACETAMINOPHEN 650 MG: 325 TABLET, FILM COATED ORAL at 12:06

## 2024-06-26 RX ADMIN — ACETAMINOPHEN 650 MG: 325 TABLET, FILM COATED ORAL at 11:06

## 2024-06-26 RX ADMIN — PROPOFOL 200 MG: 10 INJECTION, EMULSION INTRAVENOUS at 02:06

## 2024-06-26 RX ADMIN — SUGAMMADEX 200 MG: 100 INJECTION, SOLUTION INTRAVENOUS at 03:06

## 2024-06-26 RX ADMIN — Medication 30 MG: at 02:06

## 2024-06-26 RX ADMIN — SODIUM CHLORIDE, POTASSIUM CHLORIDE, SODIUM LACTATE AND CALCIUM CHLORIDE: 600; 310; 30; 20 INJECTION, SOLUTION INTRAVENOUS at 01:06

## 2024-06-26 RX ADMIN — KETOROLAC TROMETHAMINE 30 MG: 30 INJECTION, SOLUTION INTRAMUSCULAR at 02:06

## 2024-06-26 RX ADMIN — PIPERACILLIN AND TAZOBACTAM 4.5 G: 4; .5 INJECTION, POWDER, LYOPHILIZED, FOR SOLUTION INTRAVENOUS; PARENTERAL at 12:06

## 2024-06-26 RX ADMIN — OXYCODONE HYDROCHLORIDE 10 MG: 5 TABLET ORAL at 09:06

## 2024-06-26 RX ADMIN — SODIUM CHLORIDE, POTASSIUM CHLORIDE, SODIUM LACTATE AND CALCIUM CHLORIDE: 600; 310; 30; 20 INJECTION, SOLUTION INTRAVENOUS at 05:06

## 2024-06-26 RX ADMIN — ONDANSETRON 4 MG: 2 INJECTION INTRAMUSCULAR; INTRAVENOUS at 02:06

## 2024-06-26 RX ADMIN — PIPERACILLIN AND TAZOBACTAM 4.5 G: 4; .5 INJECTION, POWDER, LYOPHILIZED, FOR SOLUTION INTRAVENOUS; PARENTERAL at 08:06

## 2024-06-26 RX ADMIN — ROCURONIUM BROMIDE 50 MG: 10 INJECTION INTRAVENOUS at 02:06

## 2024-06-26 RX ADMIN — DEXMEDETOMIDINE 20 MCG: 200 INJECTION, SOLUTION INTRAVENOUS at 03:06

## 2024-06-26 RX ADMIN — DEXAMETHASONE SODIUM PHOSPHATE 8 MG: 4 INJECTION, SOLUTION INTRA-ARTICULAR; INTRALESIONAL; INTRAMUSCULAR; INTRAVENOUS; SOFT TISSUE at 02:06

## 2024-06-26 RX ADMIN — DEXMEDETOMIDINE 20 MCG: 200 INJECTION, SOLUTION INTRAVENOUS at 02:06

## 2024-06-26 RX ADMIN — CEFAZOLIN 2 G: 330 INJECTION, POWDER, FOR SOLUTION INTRAMUSCULAR; INTRAVENOUS at 02:06

## 2024-06-26 NOTE — ANESTHESIA PREPROCEDURE EVALUATION
06/26/2024  Brian Peacock Jr. is a 33 y.o., male with PMHx of smoking, presents for laparoscopic appendectomy.    NO BETA BLOCKER USE    Active Ambulatory Problems     Diagnosis Date Noted    No Active Ambulatory Problems     Resolved Ambulatory Problems     Diagnosis Date Noted    No Resolved Ambulatory Problems     No Additional Past Medical History       Pre-op Assessment    I have reviewed the NPO Status.      Review of Systems  Anesthesia Hx:  No previous Anesthesia                Social:  Smoker       Cardiovascular:  Cardiovascular Normal                                            Pulmonary:  Pulmonary Normal                       Renal/:  Renal/ Normal                 Hepatic/GI:  Hepatic/GI Normal                 Neurological:  Neurology Normal                                      Endocrine:  Endocrine Normal              Vitals:    06/26/24 0722 06/26/24 1136 06/26/24 1229 06/26/24 1304   BP:  133/70  130/75   BP Location:       Patient Position:       Pulse:  72  78   Resp: 18 18 18 20   Temp:  36.9 °C (98.5 °F)  36 °C (96.8 °F)   TempSrc:  Oral  Temporal   SpO2: 99% 100%  100%   Weight:             Physical Exam  General: Alert, Cooperative and Well nourished    Airway:  Mallampati: II   Mouth Opening: Normal  TM Distance: Normal  Tongue: Normal  Neck ROM: Normal ROM    Dental:  Intact    Chest/Lungs:  Clear to auscultation, Normal Respiratory Rate    Heart:  Rate: Normal  Rhythm: Regular Rhythm  Sounds: Normal      Lab Results   Component Value Date    WBC 7.72 06/26/2024    HGB 13.5 (L) 06/26/2024    HCT 37.9 (L) 06/26/2024    MCV 96.2 (H) 06/26/2024     06/26/2024       CMP  Sodium   Date Value Ref Range Status   06/26/2024 134 (L) 136 - 145 mmol/L Final     Potassium   Date Value Ref Range Status   06/26/2024 3.5 3.5 - 5.1 mmol/L Final     Chloride   Date Value Ref Range  Status   06/26/2024 101 98 - 107 mmol/L Final     CO2   Date Value Ref Range Status   06/26/2024 28 22 - 29 mmol/L Final     Blood Urea Nitrogen   Date Value Ref Range Status   06/26/2024 12.7 8.9 - 20.6 mg/dL Final     Creatinine   Date Value Ref Range Status   06/26/2024 1.14 0.73 - 1.18 mg/dL Final     Calcium   Date Value Ref Range Status   06/26/2024 9.3 8.4 - 10.2 mg/dL Final     Albumin   Date Value Ref Range Status   06/26/2024 3.4 (L) 3.5 - 5.0 g/dL Final     Bilirubin Total   Date Value Ref Range Status   06/26/2024 1.4 <=1.5 mg/dL Final     ALP   Date Value Ref Range Status   06/26/2024 68 40 - 150 unit/L Final     AST   Date Value Ref Range Status   06/26/2024 141 (H) 5 - 34 unit/L Final     ALT   Date Value Ref Range Status   06/26/2024 96 (H) 0 - 55 unit/L Final     eGFR   Date Value Ref Range Status   06/26/2024 >60 mL/min/1.73/m2 Final         Anesthesia Plan  Type of Anesthesia, risks & benefits discussed:    Anesthesia Type: Gen ETT  Intra-op Monitoring Plan: Standard ASA Monitors  Post Op Pain Control Plan: IV/PO Opioids PRN  Induction:  IV  Airway Plan: Direct  Informed Consent: Informed consent signed with the Patient and all parties understand the risks and agree with anesthesia plan.  All questions answered.   ASA Score: 2  Day of Surgery Review of History & Physical: H&P Update referred to the surgeon/provider.    Ready For Surgery From Anesthesia Perspective.     .

## 2024-06-26 NOTE — PLAN OF CARE
06/26/24 1033   Discharge Assessment   Assessment Type Discharge Planning Assessment   Confirmed/corrected address, phone number and insurance Yes   Confirmed Demographics Correct on Facesheet   Source of Information family;patient   Reason For Admission Acute appendicitis with localized peritonitis   People in Home significant other   Facility Arrived From: Home   Do you expect to return to your current living situation? Yes   Do you have help at home or someone to help you manage your care at home? Yes   Who are your caregiver(s) and their phone number(s)? Duy Calabrese ([phone])   Prior to hospitilization cognitive status: Alert/Oriented   Current cognitive status: Alert/Oriented   Walking or Climbing Stairs Difficulty no   Dressing/Bathing Difficulty no   Equipment Currently Used at Home none   Readmission within 30 days? No   Patient currently being followed by outpatient case management? No   Do you currently have service(s) that help you manage your care at home? No   Do you take prescription medications? No  ([facility])   Do you have prescription coverage? No   Coverage Healthy Blue M/D   Do you have any problems affording any of your prescribed medications? No   Is the patient taking medications as prescribed? yes   Who is going to help you get home at discharge? SO   How do you get to doctors appointments? car, drives self   Are you on dialysis? No   Discharge Plan A Home with family   DME Needed Upon Discharge  none   Discharge Plan discussed with: Patient;Spouse/sig other   Name(s) and Number(s) Duy Calabrese ([phone])   Transition of Care Barriers None   Physical Activity   On average, how many days per week do you engage in moderate to strenuous exercise (like a brisk walk)? 3 days   On average, how many minutes do you engage in exercise at this level? 60 min   Financial Resource Strain   How hard is it for you to pay for the very basics like food, housing, medical care, and  heating? Not hard   Housing Stability   In the last 12 months, was there a time when you were not able to pay the mortgage or rent on time? N   At any time in the past 12 months, were you homeless or living in a shelter (including now)? N   Transportation Needs   Has the lack of transportation kept you from medical appointments, meetings, work or from getting things needed for daily living? No   Food Insecurity   Within the past 12 months, you worried that your food would run out before you got the money to buy more. Never true   Within the past 12 months, the food you bought just didn't last and you didn't have money to get more. Never true   Stress   Do you feel stress - tense, restless, nervous, or anxious, or unable to sleep at night because your mind is troubled all the time - these days? Not at all   Social Isolation   How often do you feel lonely or isolated from those around you?  Never   Alcohol Use   Q1: How often do you have a drink containing alcohol? Monthly or l   Q2: How many drinks containing alcohol do you have on a typical day when you are drinking? 1 or 2   Q3: How often do you have six or more drinks on one occasion? Never   Utilities   In the past 12 months has the electric, gas, oil, or water company threatened to shut off services in your home? No   Health Literacy   How often do you need to have someone help you when you read instructions, pamphlets, or other written material from your doctor or pharmacy? Never   OTHER   Name(s) of People in Home Duy DONOVAN Guanakito ([phone])     Pt single with no children; Resides with SO; Employed as cook for [employer]; Independent with ADL's; No needs identified at this time.

## 2024-06-26 NOTE — OP NOTE
APPENDECTOMY, LAPAROSCOPIC Procedure Note  Brian Peacock Jr.  MRN:  [id number]  : 1990  Procedure Date: 2024    Procedure: APPENDECTOMY, LAPAROSCOPIC    Surgeon(s) and Role:  Staff: Niko Padilla MD - Primary  Resident(s): Jeremy Carter MD PGY-3; Becky Mendoza DO PGY-1    Pre-Operative Diagnosis: Acute appendicitis with localized peritonitis, no perforation or abscess    Post-Operative Diagnosis: Same as above    Anesthesia: GETA    Operative Findings: Inflamed retrocecal appendix with no evidence of perforation or gangrenous changes. Some reactive fluid in pelvis but no concern for purulence or succus.     Description of Technical Procedures:   The patient was identified in the pre-op holding area by name, , and MRN. After verifying that written informed consent had been obtained, the patient was taken to the OR and placed on the table in the supine position. GETA was administered by anesthesia w/o complications. The abdomen was prepped and draped in the usual sterile fashion. A standard time-out was performed, again identifying the correct patient and procedure to be performed.     An 11 blade was used to make a 5 mm incision superior to the umbilicus. The abdomen was entered under direct visualization using a visual trocar. After pneumoperitoneum was established, patient was placed in Trendelenburg position. A 5 mm trocar was placed in the suprapubic region under visualization with the laparoscope. A 12 mm trocar was then placed in the left lower quadrant in similar fashion. The abdomen was surveyed, and no injuries were noted to bowel or any structures.    Attention was then turned toward the right lower quadrant, where an inflamed, edematous appendix was noted. A combination of blunt dissection and electrocautery via Ligasure were used to free the appendix from its inflammatory attachments. The base of the appendix was then located where it entered the cecum and it appeared  to be healthy. The mesoappendix was appropriately ligated using ligasure and hemostasis was confirmed. At this time, base of the appendix was visible and easily accessible without any surrounding structures impeding pathway of the stapler. A white load of the 45 mm endoscopic stapler was then attempted to be fired across the base of the appendix, with malfunctioning of mechanism x 2. After re-inspecting the area of attempted staple line, it was deemed to be healthy and an automatic endoscopic stapler was used to fire 2 white loads across the base of the appendix, taking care to avoid any small bowel or cecum. The staple line was inspected and felt to be complete and hemostatic. The appendix was then removed using an Endo Catch bag. 12mm port was closed with 0-Vicryl on UR6 with Granee needle, making sure to not damage any underlying structures. Trochars were removed and hemostasis confirmed.  Incisions were then closed with subcuticular 4-0 Monocryl suture and dermabond.    Patient was awakened from anesthesia without incident and brought to the PACU in stable condition. All suture, needle, and instrument counts were correct x2 at the conclusion of the case.     Dr. Padilla was present for all critical portions of the case.     Estimated Blood Loss:  Less than 5cc      Drains: none           Specimens: Appendix           Implants: None     Complications: None           Disposition: PACU - hemodynamically stable.           Condition: stable    Becky Mendoza DO  [facility] PGY1

## 2024-06-26 NOTE — PLAN OF CARE
Problem: Adult Inpatient Plan of Care  Goal: Plan of Care Review  Outcome: Progressing  Goal: Patient-Specific Goal (Individualized)  Outcome: Progressing  Goal: Absence of Hospital-Acquired Illness or Injury  Outcome: Progressing  Goal: Optimal Comfort and Wellbeing  Outcome: Progressing  Goal: Readiness for Transition of Care  Outcome: Progressing     Problem: Pain Acute  Goal: Optimal Pain Control and Function  Outcome: Progressing     Problem: Wound  Goal: Optimal Coping  Outcome: Progressing  Goal: Optimal Functional Ability  Outcome: Progressing  Goal: Absence of Infection Signs and Symptoms  Outcome: Progressing  Goal: Improved Oral Intake  Outcome: Progressing  Goal: Optimal Pain Control and Function  Outcome: Progressing  Goal: Skin Health and Integrity  Outcome: Progressing  Goal: Optimal Wound Healing  Outcome: Progressing

## 2024-06-26 NOTE — ANESTHESIA PROCEDURE NOTES
Intubation    Date/Time: 6/26/2024 2:16 PM    Performed by: Filiberto Alcantara CRNA  Authorized by: Maddy Joe MD    Intubation:     Induction:  Intravenous    Intubated:  Postinduction    Mask Ventilation:  Easy mask    Attempts:  1    Attempted By:  CRNA    Method of Intubation:  Direct    Blade:  Avril 4    Laryngeal View Grade: Grade I - full view of cords      Difficult Airway Encountered?: No      Complications:  None    Airway Device:  Oral endotracheal tube    Airway Device Size:  7.5    Style/Cuff Inflation:  Cuffed (inflated to minimal occlusive pressure)    Inflation Amount (mL):  8    Tube secured:  23    Secured at:  The lips    Placement Verified By:  Capnometry    Complicating Factors:  None    Findings Post-Intubation:  BS equal bilateral and atraumatic/condition of teeth unchanged

## 2024-06-26 NOTE — PROGRESS NOTES
"[facility] Team  Daily Progress Note    Subjective:  No acute events overnight. Afebrile. Hemodynamically stable.   States pain is stable; well controlled on current regimen.   Voiding spontaneously without issue.   Denies fever, chills, chest pain, nausea, vomiting this AM. Ready for surgery today.    Objective:  Vitals:  Vitals:    06/26/24 0321   BP: 117/67   Pulse: 83   Resp: 18   Temp: 99.4 °F (37.4 °C)      Intake/Output:  No intake or output data in the 24 hours ending 06/26/24 0602    Physical Exam:  Gen: NAD  Neuro: awake, alert, answering questions appropriately  CV: RR on RP  Resp: non-labored breathing, JOE  Abd: soft, ND. Significant TTP to RLQ and periumbilical region. No rebound or guarding. Positive McBurney's sign.   : deferred  Ext: moves all 4 spontaneously and purposefully  Skin: warm, well perfused    Labs:  Renal:  Recent Labs     06/25/24  1210 06/26/24  0509   BUN 15.6 12.7   CREATININE 1.15 1.14     No results for input(s): "LACTIC" in the last 72 hours.  FENGI:  Recent Labs     06/25/24  1210 06/26/24  0509    134*   K 3.5 3.5   CL 99 101   CO2 24 28   CALCIUM 10.5* 9.3   MG  --  1.70   PHOS  --  3.1   ALBUMIN 4.6 3.4*   BILITOT 2.7* 1.4   AST 25 141*   ALKPHOS 66 68   ALT 13 96*     Heme:  Recent Labs     06/25/24  1210   HGB 15.5   HCT 43.1        ID:  Recent Labs     06/25/24  1210   WBC 12.24*     CBG:  Recent Labs     06/25/24  1210 06/26/24  0509   GLUCOSE 103* 120*      Cardiovascular:  No results for input(s): "TROPONINI", "CKTOTAL", "CKMB", "BNP" in the last 168 hours.  I have reviewed all pertinent lab results within the past 24 hours.    Imaging:  CT Abdomen/Pelvis 6/25:  Impression:  The appendix is dilated measuring up to 9 mm in diameter, with mild surrounding inflammatory changes, findings suggestive of early acute appendicitis. No drainable fluid collection or free air.     Assessment/Plan:   Patient is a 33M with no significant PMH who " presents today with abdominal pain present for the last 3-4 days associated with subjective fevers, nausea, vomiting, and constipation. Clinical and radiographic findings consistent with early acute appendicitis.      - OR today for laparoscopic appendectomy. After discussing risks, benefits, and alternatives to surgery, he elects to proceed. All questions and concerns addressed. Consent confirmed.   - NPO with mIVF pending surgery today. Okay for diet post-operatively.  - Zosyn  - Scheduled tylenol; oxycodone PRN. Will broaden regimen if needed.   - Antiemetics PRN  - Encourage ambulation/OOB  - Lovenox for DVT ppx     Please call surgery with any acute changes in clinical exam and/or hemodynamic status.     Becky Mendoza, DO  [facility], PGY1  06/26/2024 6:01 AM

## 2024-06-26 NOTE — TRANSFER OF CARE
Anesthesia Transfer of Care Note    Patient: Brian Peacock Jr.    Procedure(s) Performed: Procedure(s) (LRB):  APPENDECTOMY, LAPAROSCOPIC (N/A)    Patient location: PACU    Anesthesia Type: general    Transport from OR: Transported from OR on room air with adequate spontaneous ventilation    Post pain: adequate analgesia    Post assessment: no apparent anesthetic complications    Post vital signs: stable    Level of consciousness: awake    Nausea/Vomiting: no nausea/vomiting    Complications: none    Transfer of care protocol was followed      Last vitals: Visit Vitals  /75   Pulse 78   Temp 36 °C (96.8 °F) (Temporal)   Resp 20   Wt 73.4 kg (161 lb 13.1 oz)   SpO2 100%   BMI 20.23 kg/m²

## 2024-06-26 NOTE — OP NOTE
hospitals GENERAL SURGERY [facility] OPERATIVE REPORT    Patient Name: Brian Peacock Jr.  Date of Admission: 6/25/2024  YOB: 1990  Date of procedure: 06/26/2024    Attending Surgeon: Niko Padilla M.D.     Resident Surgeon(s): Becky Mendoza, PGY-1; Jeremy Carter, PGY-3    Pre-operative diagnosis:  1. Acute appendicitis      Post-operative diagnosis:   1. Acute uncomplicated non-perforated appendicitis    Procedure:  1. Laparoscopic appendectomy    Anesthesia: General    Complications: None    Specimens:   Specimens (From admission, onward)       Start     Ordered    06/26/24 1509  Specimen to Pathology  RELEASE UPON ORDERING        References:    Click here for ordering Quick Tip   Question:  Release to patient  Answer:  Immediate    06/26/24 1509                      Blood loss: 10 mL    Indication:  Brian Peacock Jr. is a 33 y.o. male who presented with acute onset abdominal pain, nausea, and vomiting. CT scan confirmed acute appendicitis.     Findings:  Acute uncomplicated appendicitis without perforation, abscess, or contamination.    Procedure in detail:  The patient was brought to the operating theater and placed in a supine position.  General endotracheal anesthesia was induced.  The abdomen was prepped and draped in the usual sterile fashion.  Timeout was performed.  Perioperative antibiotics were administered.     We began by making a midline transverse 5 mm incision 12 cm caudal to the xiphoid process.  We then used an Optiview trocar under direct visualization with the laparoscoped to successfully enter the abdominal cavity.  Pneumoperitoneum was successfully achieved.  The abdomen was inspected and noted to be free of any iatrogenic injury.  We then stuck in additional 5 mm trocar just above the pubis at the midline and a 15 mm trocar in the right lower quadrant.  The patient was then put in head down and left side down airplane positioning.  The small bowel was moved  medially away from the right lower quadrant, exposing the ascending colon.  We traced the tinea down to the cecum, terminal ileum, and identified the appendix.  It appeared enlarged and inflamed but without evidence of perforation or purulence.  We used allergic ligature device to take down the mesentery of the appendix.  We then used a white load 45 mm JAMI laparoscopic stapler to transect the appendix.  We did use a 2nd load to complete our transection.  We then used the Endo-Catch bag to remove the appendix through the 12 mm port site.  The staple line was inspected and noted to be hemostatic.  The pelvis was suctioned from any remaining free fluid.  We then used a no laparoscopic suture Passer to close the fascia of our 12 mm port site under direct visualization with the laparoscopic.  All instruments were removed from the abdomen and the abdomen desufflated.  The skin was then closed with 4-0 Monocryl.  And Dermabond applied.  The patient awoken from anesthesia and was brought to recovery in stable position. He tolerated the procedure well without immediate complication.     Dr. Padilla was present and available for all critical portions of the case.    Jeremy Carter MD  [facility], PGY-3

## 2024-06-26 NOTE — PLAN OF CARE
Problem: Adult Inpatient Plan of Care  Goal: Plan of Care Review  6/26/2024 0511 by Tish Villalta RN  Outcome: Progressing  6/26/2024 0509 by Tish Villalta RN  Outcome: Progressing  Goal: Patient-Specific Goal (Individualized)  6/26/2024 0511 by Tish Villalta RN  Outcome: Progressing  6/26/2024 0509 by Tish Villalta RN  Outcome: Progressing  Goal: Absence of Hospital-Acquired Illness or Injury  6/26/2024 0511 by Tish Villalta RN  Outcome: Progressing  6/26/2024 0509 by Tish Villalta RN  Outcome: Progressing  Goal: Optimal Comfort and Wellbeing  6/26/2024 0511 by Tish Villalta RN  Outcome: Progressing  6/26/2024 0509 by Tish Villalta RN  Outcome: Progressing  Goal: Readiness for Transition of Care  6/26/2024 0511 by Tish Villalta RN  Outcome: Progressing  6/26/2024 0509 by Tish Villalta RN  Outcome: Progressing     Problem: Pain Acute  Goal: Optimal Pain Control and Function  Outcome: Progressing  Intervention: Develop Pain Management Plan  Flowsheets (Taken 6/26/2024 0510)  Pain Management Interventions: pain management plan reviewed with patient/caregiver  Intervention: Optimize Psychosocial Wellbeing  Flowsheets (Taken 6/26/2024 0510)  Supportive Measures: active listening utilized

## 2024-06-26 NOTE — ANESTHESIA POSTPROCEDURE EVALUATION
Anesthesia Post Evaluation    Patient: Brian Peacock Jr.    Procedure(s) Performed: Procedure(s) (LRB):  APPENDECTOMY, LAPAROSCOPIC (N/A)    Final Anesthesia Type: general      Patient location during evaluation: med/surg floor  Post-procedure vital signs: reviewed and stable  Airway patency: patent      Anesthetic complications: no      Cardiovascular status: hemodynamically stable  Respiratory status: spontaneous ventilation  Follow-up not needed.              Vitals Value Taken Time   /81 06/26/24 1624   Temp 36.8 °C (98.3 °F) 06/26/24 1624   Pulse 76 06/26/24 1745   Resp 18 06/26/24 1616   SpO2 96 % 06/26/24 1745         Event Time   Out of Recovery 06/26/2024 16:18:00         Pain/Baldev Score: Pain Rating Prior to Med Admin: 7 (6/26/2024 12:29 PM)  Pain Rating Post Med Admin: 0 (6/26/2024  1:28 AM)  Baldev Score: 9 (6/26/2024  3:54 PM)

## 2024-06-27 VITALS
BODY MASS INDEX: 20.23 KG/M2 | HEART RATE: 66 BPM | DIASTOLIC BLOOD PRESSURE: 73 MMHG | WEIGHT: 161.81 LBS | OXYGEN SATURATION: 100 % | TEMPERATURE: 98 F | RESPIRATION RATE: 18 BRPM | SYSTOLIC BLOOD PRESSURE: 132 MMHG

## 2024-06-27 PROBLEM — K35.30 ACUTE APPENDICITIS WITH LOCALIZED PERITONITIS, WITHOUT PERFORATION, ABSCESS, OR GANGRENE: Status: ACTIVE | Noted: 2024-06-27

## 2024-06-27 PROBLEM — K35.30 ACUTE APPENDICITIS WITH LOCALIZED PERITONITIS, WITHOUT PERFORATION, ABSCESS, OR GANGRENE: Status: RESOLVED | Noted: 2024-06-27 | Resolved: 2024-06-27

## 2024-06-27 LAB
ALBUMIN SERPL-MCNC: 3 G/DL (ref 3.5–5)
ALBUMIN/GLOB SERPL: 1.1 RATIO (ref 1.1–2)
ALP SERPL-CCNC: 81 UNIT/L (ref 40–150)
ALT SERPL-CCNC: 145 UNIT/L (ref 0–55)
ANION GAP SERPL CALC-SCNC: 5 MEQ/L
AST SERPL-CCNC: 131 UNIT/L (ref 5–34)
BASOPHILS # BLD AUTO: 0 X10(3)/MCL
BASOPHILS NFR BLD AUTO: 0 %
BILIRUB SERPL-MCNC: 0.5 MG/DL
BUN SERPL-MCNC: 10 MG/DL (ref 8.9–20.6)
CALCIUM SERPL-MCNC: 9 MG/DL (ref 8.4–10.2)
CHLORIDE SERPL-SCNC: 103 MMOL/L (ref 98–107)
CO2 SERPL-SCNC: 29 MMOL/L (ref 22–29)
CREAT SERPL-MCNC: 0.98 MG/DL (ref 0.73–1.18)
CREAT/UREA NIT SERPL: 10
EOSINOPHIL # BLD AUTO: 0 X10(3)/MCL (ref 0–0.9)
EOSINOPHIL NFR BLD AUTO: 0 %
ERYTHROCYTE [DISTWIDTH] IN BLOOD BY AUTOMATED COUNT: 11 % (ref 11.5–17)
GFR SERPLBLD CREATININE-BSD FMLA CKD-EPI: >60 ML/MIN/1.73/M2
GLOBULIN SER-MCNC: 2.8 GM/DL (ref 2.4–3.5)
GLUCOSE SERPL-MCNC: 139 MG/DL (ref 74–100)
HCT VFR BLD AUTO: 36.3 % (ref 42–52)
HGB BLD-MCNC: 13 G/DL (ref 14–18)
HOLD SPECIMEN: NORMAL
IMM GRANULOCYTES # BLD AUTO: 0.03 X10(3)/MCL (ref 0–0.04)
IMM GRANULOCYTES NFR BLD AUTO: 0.6 %
LYMPHOCYTES # BLD AUTO: 0.36 X10(3)/MCL (ref 0.6–4.6)
LYMPHOCYTES NFR BLD AUTO: 7.3 %
MAGNESIUM SERPL-MCNC: 1.9 MG/DL (ref 1.6–2.6)
MCH RBC QN AUTO: 34.2 PG (ref 27–31)
MCHC RBC AUTO-ENTMCNC: 35.8 G/DL (ref 33–36)
MCV RBC AUTO: 95.5 FL (ref 80–94)
MONOCYTES # BLD AUTO: 0.33 X10(3)/MCL (ref 0.1–1.3)
MONOCYTES NFR BLD AUTO: 6.7 %
NEUTROPHILS # BLD AUTO: 4.24 X10(3)/MCL (ref 2.1–9.2)
NEUTROPHILS NFR BLD AUTO: 85.4 %
NRBC BLD AUTO-RTO: 0 %
PHOSPHATE SERPL-MCNC: 2.1 MG/DL (ref 2.3–4.7)
PLATELET # BLD AUTO: 147 X10(3)/MCL (ref 130–400)
PMV BLD AUTO: 11.5 FL (ref 7.4–10.4)
POTASSIUM SERPL-SCNC: 4 MMOL/L (ref 3.5–5.1)
PROT SERPL-MCNC: 5.8 GM/DL (ref 6.4–8.3)
RBC # BLD AUTO: 3.8 X10(6)/MCL (ref 4.7–6.1)
SODIUM SERPL-SCNC: 137 MMOL/L (ref 136–145)
WBC # BLD AUTO: 4.96 X10(3)/MCL (ref 4.5–11.5)

## 2024-06-27 PROCEDURE — 84100 ASSAY OF PHOSPHORUS: CPT

## 2024-06-27 PROCEDURE — 25000003 PHARM REV CODE 250

## 2024-06-27 PROCEDURE — 94761 N-INVAS EAR/PLS OXIMETRY MLT: CPT

## 2024-06-27 PROCEDURE — 36415 COLL VENOUS BLD VENIPUNCTURE: CPT

## 2024-06-27 PROCEDURE — 36415 COLL VENOUS BLD VENIPUNCTURE: CPT | Mod: 91 | Performed by: STUDENT IN AN ORGANIZED HEALTH CARE EDUCATION/TRAINING PROGRAM

## 2024-06-27 PROCEDURE — 63600175 PHARM REV CODE 636 W HCPCS

## 2024-06-27 PROCEDURE — 96366 THER/PROPH/DIAG IV INF ADDON: CPT

## 2024-06-27 PROCEDURE — 85025 COMPLETE CBC W/AUTO DIFF WBC: CPT

## 2024-06-27 PROCEDURE — G0378 HOSPITAL OBSERVATION PER HR: HCPCS

## 2024-06-27 PROCEDURE — 96361 HYDRATE IV INFUSION ADD-ON: CPT

## 2024-06-27 PROCEDURE — 83735 ASSAY OF MAGNESIUM: CPT

## 2024-06-27 PROCEDURE — 80053 COMPREHEN METABOLIC PANEL: CPT

## 2024-06-27 RX ORDER — OXYCODONE HYDROCHLORIDE 5 MG/1
5 TABLET ORAL EVERY 4 HOURS PRN
Qty: 12 TABLET | Refills: 0 | Status: SHIPPED | OUTPATIENT
Start: 2024-06-27

## 2024-06-27 RX ADMIN — ACETAMINOPHEN 650 MG: 325 TABLET, FILM COATED ORAL at 05:06

## 2024-06-27 RX ADMIN — PIPERACILLIN AND TAZOBACTAM 4.5 G: 4; .5 INJECTION, POWDER, LYOPHILIZED, FOR SOLUTION INTRAVENOUS; PARENTERAL at 02:06

## 2024-06-27 RX ADMIN — OXYCODONE HYDROCHLORIDE 10 MG: 5 TABLET ORAL at 01:06

## 2024-06-27 RX ADMIN — OXYCODONE HYDROCHLORIDE 10 MG: 5 TABLET ORAL at 06:06

## 2024-06-27 RX ADMIN — SODIUM CHLORIDE, POTASSIUM CHLORIDE, SODIUM LACTATE AND CALCIUM CHLORIDE: 600; 310; 30; 20 INJECTION, SOLUTION INTRAVENOUS at 01:06

## 2024-06-27 NOTE — DISCHARGE SUMMARY
[facility]  General Surgery  Discharge Summary      Patient Name: Brian Peacock Jr.  MRN: [id number]  Admission Date: 6/25/2024  Hospital Length of Stay: 1 day  Discharge Date and Time:  06/27/2024 9:54 AM  Attending Physician: Niko Padilla MD   Discharging Provider: Becky Mendoza MD  Primary Care Provider: Flaca, Primary Doctor     HPI/Hospital course: Patient presented to [facility] ED on 6/25 with 3-4 days of abdominal pain, nausea, vomiting, and constipation. On arrival, he was afebrile and hemodynamically stable but labs significant for leukocytosis of 12.2. CT abdomen/pelvis obtained significant for dilation of the appendix with surrounding inflammatory changes, concerning for acute early appendicitis. Patient was subsequently admitted to the general surgery service and underwent laparoscopic appendectomy on 6/26 without any immediate complications. See operative note for details. Post-operative course uncomplicated. On the morning of 6/27, patient's pain was well controlled with PO medication, tolerating regular diet without any nausea or vomiting, voiding spontaneously with good UOP, no fevers, having appropriate return of bowel function, and ambulating unassisted. At that time, patient deemed stable for discharge with post-operative instructions, return precautions, and 2 week follow up. He verbalized understanding and was discharged without any issue. All questions and concerns addressed.     Procedure(s) (LRB):  APPENDECTOMY, LAPAROSCOPIC (N/A)     Consults: None    Significant Diagnostic Studies: Labs: CMP   Recent Labs   Lab 06/25/24  1210 06/26/24  0509 06/27/24  0455    134* 137   K 3.5 3.5 4.0   CL 99 101 103   CO2 24 28 29   BUN 15.6 12.7 10.0   CREATININE 1.15 1.14 0.98   CALCIUM 10.5* 9.3 9.0   ALBUMIN 4.6 3.4* 3.0*   BILITOT 2.7* 1.4 0.5   ALKPHOS 66 68 81   AST 25 141* 131*   ALT 13 96* 145*   , CBC   Recent Labs   Lab 06/25/24  1210  06/26/24  0509 06/27/24  0455   WBC 12.24* 7.72 4.96   HGB 15.5 13.5* 13.0*   HCT 43.1 37.9* 36.3*    169 147   , and All labs within the past 24 hours have been reviewed  Radiology: CT scan: CT ABDOMEN PELVIS WITHOUT CONTRAST: No results found for this visit on 06/25/24.    Pending Diagnostic Studies:       Procedure Component Value Units Date/Time    Specimen to Pathology [[id number]] Collected: 06/26/24 1508    Order Status: Sent Lab Status: No result     Specimen: Tissue from Appendix           Final Active Diagnoses:      Problems Resolved During this Admission:    Diagnosis Date Noted Date Resolved POA    PRINCIPAL PROBLEM:  Acute appendicitis with localized peritonitis, without perforation, abscess, or gangrene [K35.30] 06/27/2024 06/27/2024 Yes      Discharged Condition: good    Disposition: Home or Self Care    Follow Up: Follow up in general surgery clinic in 2 weeks.     Patient Instructions:      Other restrictions (specify):   Order Comments: No heavy lifting for 4-6 weeks post-operatively.     Notify your health care provider if you experience any of the following:  temperature >100.4     Notify your health care provider if you experience any of the following:  persistent nausea and vomiting or diarrhea     Notify your health care provider if you experience any of the following:  severe uncontrolled pain     Notify your health care provider if you experience any of the following:  redness, tenderness, or signs of infection (pain, swelling, redness, odor or green/yellow discharge around incision site)     Notify your health care provider if you experience any of the following:  worsening rash     Notify your health care provider if you experience any of the following:  severe persistent headache     Notify your health care provider if you experience any of the following:  difficulty breathing or increased cough     No dressing needed   Order Comments: Okay to shower. Let soap and warm water run  over incisions and pat dry. Do not submerge in water for 2 weeks after surgery. Sutures closing wound are absorbable so nothing needs to be removed. Skin glue over incisions will fall off on own over the next 1-2 weeks.     Medications:  Reconciled Home Medications:      Medication List        START taking these medications      oxyCODONE 5 MG immediate release tablet  Commonly known as: ROXICODONE  Take 1 tablet (5 mg total) by mouth every 4 (four) hours as needed for Pain.            CONTINUE taking these medications      azelastine 137 mcg (0.1 %) nasal spray  Commonly known as: ASTELIN  1 spray (137 mcg total) by Nasal route 2 (two) times daily. for 7 days     cetirizine 10 MG tablet  Commonly known as: ZYRTEC  Take 1 tablet (10 mg total) by mouth once daily. for 14 days            Becky Mendoza DO  [facility] PGY-1  [facility]

## 2024-06-28 LAB — BACTERIA UR CULT: NO GROWTH

## 2024-07-02 ENCOUNTER — OFFICE VISIT (OUTPATIENT)
Dept: URGENT CARE | Facility: CLINIC | Age: 34
End: 2024-07-02
Payer: MEDICAID

## 2024-07-02 ENCOUNTER — OFFICE VISIT (OUTPATIENT)
Dept: SURGERY | Facility: CLINIC | Age: 34
End: 2024-07-02
Payer: MEDICAID

## 2024-07-02 ENCOUNTER — TELEPHONE (OUTPATIENT)
Dept: SURGERY | Facility: CLINIC | Age: 34
End: 2024-07-02
Payer: MEDICAID

## 2024-07-02 VITALS
RESPIRATION RATE: 18 BRPM | HEIGHT: 75 IN | HEIGHT: 75 IN | WEIGHT: 171 LBS | BODY MASS INDEX: 21.26 KG/M2 | DIASTOLIC BLOOD PRESSURE: 77 MMHG | BODY MASS INDEX: 21.26 KG/M2 | OXYGEN SATURATION: 99 % | SYSTOLIC BLOOD PRESSURE: 128 MMHG | WEIGHT: 171 LBS | HEART RATE: 56 BPM | TEMPERATURE: 98 F | OXYGEN SATURATION: 99 % | TEMPERATURE: 99 F | HEART RATE: 64 BPM | DIASTOLIC BLOOD PRESSURE: 80 MMHG | SYSTOLIC BLOOD PRESSURE: 136 MMHG

## 2024-07-02 DIAGNOSIS — J02.9 SORE THROAT: Primary | ICD-10-CM

## 2024-07-02 LAB
CTP QC/QA: YES
MOLECULAR STREP A: NEGATIVE

## 2024-07-02 PROCEDURE — 99213 OFFICE O/P EST LOW 20 MIN: CPT | Mod: PBBFAC

## 2024-07-02 PROCEDURE — 99214 OFFICE O/P EST MOD 30 MIN: CPT | Mod: PBBFAC,27 | Performed by: NURSE PRACTITIONER

## 2024-07-02 PROCEDURE — 87651 STREP A DNA AMP PROBE: CPT | Mod: PBBFAC | Performed by: NURSE PRACTITIONER

## 2024-07-02 PROCEDURE — 99203 OFFICE O/P NEW LOW 30 MIN: CPT | Mod: S$PBB,,, | Performed by: NURSE PRACTITIONER

## 2024-07-02 RX ORDER — METRONIDAZOLE 500 MG/1
2000 TABLET ORAL
COMMUNITY
Start: 2024-04-19

## 2024-07-02 RX ORDER — IBUPROFEN 800 MG/1
800 TABLET ORAL 3 TIMES DAILY
Qty: 15 TABLET | Refills: 0 | Status: SHIPPED | OUTPATIENT
Start: 2024-07-02 | End: 2024-07-07

## 2024-07-02 NOTE — LETTER
July 2, 2024      [facility]  [address] [address]  Phone: [phone]       Patient: Brian Peacock   YOB: 1990  Date of Visit: 07/02/2024    To Whom It May Concern:    Noel Peacock  was at [facility] on 07/02/2024. The patient may return to work/school on JULY 3 2024 with no restrictions. If you have any questions or concerns, or if I can be of further assistance, please do not hesitate to contact me.    Sincerely,    KOBE BAEZ NP

## 2024-07-02 NOTE — PROGRESS NOTES
[facility]   Clinic Note       CC: Post op day 6 s/p Appendectomy       HPI: Brian Peacock Jr. is a 33 y.o. male with PMHx of ***       Physical Exam:   Vitals:    07/02/24 1402   BP: 128/80   Pulse: (!) 56   Temp: 97.6 °F (36.4 °C)      Gen: NAD, AAOx3   Eye: EOMI   CV: RR  Pulm: NWOB on RA  Abd: soft, non-tender, non-distended  Ext:  Move all 4 extremities.       Interval Labs:    ***       Interval Imaging:    ***       Interval Pathology:    Appendectomy pathology report not yet available       A/P: Brian Peacock Jr. is a 33 y.o. male with PMHx of ***       - ***  - Follow up***    Ambrocio Jesus M-3  [facility] General Surgery

## 2024-07-02 NOTE — PATIENT INSTRUCTIONS
Please follow instructions on patient education material.      Return to urgent care in 2 to 3 days if symptoms are not improving, immediately if you develop any new or worsening symptoms.   - Warm salt water gargles to your throat  - OTC cold/flu products as desired for symptoms  - Plenty of fluids  - Humidified air  - Tylenol or Motrin for pain/fever    Go to the ER if you experience chest pain with shortness of breath, shortness of breath when moving around your house, high fevers 103.0+, excessive vomiting/diarrhea, or general distress.

## 2024-07-02 NOTE — PROGRESS NOTES
[facility]   Clinic Note    HPI  32 yo M s/p lap appy for non-perforated appenditis on 6/27.     Subjective  Reporting sore throat, subjective fevers/chills. Denies N/V, RLQ abd pain, diarrhea. His BM have been a little different than normal. Denies dysuria.       Physical Exam  Wt Readings from Last 3 Encounters:   07/02/24 77.6 kg (171 lb)   06/25/24 73.4 kg (161 lb 13.1 oz)   10/25/23 73.9 kg (162 lb 14.7 oz)     Temp Readings from Last 3 Encounters:   07/02/24 97.6 °F (36.4 °C) (Oral)   06/27/24 97.6 °F (36.4 °C) (Oral)   10/25/23 97 °F (36.1 °C) (Temporal)     BP Readings from Last 3 Encounters:   07/02/24 128/80   06/27/24 132/73   10/25/23 119/79     Pulse Readings from Last 3 Encounters:   07/02/24 (!) 56   06/27/24 66   10/25/23 64       NAD  RRR  Non-labored breathing, JOE  S, ND, NT  Incisions healing well  Moves all extremities      A/P  32 yo M s/p lap appy. Do not believe fevers/chills to be related to post operative complication given benign abdominal exam and stable vitals on arrival.     Symptoms concerning for URI vs strep. Recommend fu at urgent care for respiratory sxs  Keep post op apt to review surgical pathology which has not yet been processed    Elaine Holland MD   [facility] Surgery, PGY3  07/02/2024 2:13 PM

## 2024-07-02 NOTE — PROGRESS NOTES
Patient seen by Dr. MEKHI Holland will return for post op visit. Written and verbal discharge instructions given.

## 2024-07-02 NOTE — TELEPHONE ENCOUNTER
Spoke with patient and advised patient to come in to clinic to be seen because worry of infection. Patient sated that he has been running fever patient put on schedule for 130pm.

## 2024-07-02 NOTE — PROGRESS NOTES
"Subjective:      Patient ID: Brian Peacock Jr. is a 33 y.o. male.    Vitals:  height is 6' 3" (1.905 m) and weight is 77.6 kg (171 lb). His oral temperature is 98.5 °F (36.9 °C). His blood pressure is 136/77 and his pulse is 64. His respiration is 18 and oxygen saturation is 99%.     Chief Complaint: Sore Throat (Pt reports sore throat, HA x 3 days )    Sore Throat      As stated in CC. Pt reports here as recommended by his general surgeon after his postop visit today with general surgery for appendix removal  with c/o fever , stating  I did not check my temperature, I just felt hot last night".  Patient states fever and headache resolved without any treatment.  Patient reports pain,  sore throat and headaches for 3 days.  Reviewed records and surgeon notes states that he does not anticipate that the patient's pain and chills as related to a postop infection.  Patient denies any cough, shortness for breath, chest pain, nausea or vomiting.      HENT:  Positive for sore throat.    Skin:  Negative for erythema.      Objective:     Physical Exam   Constitutional: He is oriented to person, place, and time. He appears well-developed.  Non-toxic appearance. He does not appear ill. No distress.   HENT:   Head: Normocephalic and atraumatic.   Ears:   Right Ear: Tympanic membrane normal.   Left Ear: Tympanic membrane normal.   Nose: Nose normal. No rhinorrhea, purulent discharge or congestion. Right sinus exhibits no maxillary sinus tenderness and no frontal sinus tenderness. Left sinus exhibits no maxillary sinus tenderness and no frontal sinus tenderness.   Mouth/Throat: Uvula is midline. Mucous membranes are moist. No oropharyngeal exudate or posterior oropharyngeal erythema.   Eyes: Conjunctivae are normal. Right eye exhibits no discharge. Left eye exhibits no discharge. Extraocular movement intact   Neck: Neck supple. No neck rigidity present.   Cardiovascular: Regular rhythm and normal pulses. "   Pulmonary/Chest: Effort normal and breath sounds normal. No stridor. No respiratory distress. He has no wheezes. He has no rhonchi. He has no rales. He exhibits no tenderness.   Abdominal: Normal appearance. He exhibits no distension. There is no abdominal tenderness.   Musculoskeletal: Normal range of motion.         General: No tenderness. Normal range of motion.   Lymphadenopathy:     He has no cervical adenopathy.   Neurological: He is alert and oriented to person, place, and time.   Skin: Skin is warm, dry, not diaphoretic and no rash. Capillary refill takes less than 2 seconds. No erythema   Psychiatric: His behavior is normal. Mood, judgment and thought content normal.   Nursing note and vitals reviewed.      Assessment:     1. Sore throat      Results for orders placed or performed in visit on 07/02/24   POCT Strep A, Molecular   Result Value Ref Range    Molecular Strep A, POC Negative Negative     Acceptable Yes          Plan:   ER precautions  - Warm salt water gargles to your throat  - OTC cold/flu products as desired for symptoms  - Plenty of fluids  - Humidified air  - Tylenol or Motrin for pain/fever  Sore throat  -     POCT Strep A, Molecular  -     ibuprofen (ADVIL,MOTRIN) 800 MG tablet; Take 1 tablet (800 mg total) by mouth 3 (three) times daily. for 5 days  Dispense: 15 tablet; Refill: 0

## 2024-07-02 NOTE — TELEPHONE ENCOUNTER
----- Message from Isabella Mccoy sent at 7/2/2024 10:11 AM CDT -----  Regarding: pt call  Pt has been having pain at night and fevers and is requesting more pain meds. Please call him. Thanks

## 2024-07-03 LAB
ESTROGEN SERPL-MCNC: NORMAL PG/ML
INSULIN SERPL-ACNC: NORMAL U[IU]/ML
LAB AP CLINICAL INFORMATION: NORMAL
LAB AP GROSS DESCRIPTION: NORMAL
LAB AP REPORT FOOTNOTES: NORMAL
T3RU NFR SERPL: NORMAL %

## 2025-02-05 ENCOUNTER — HOSPITAL ENCOUNTER (EMERGENCY)
Facility: HOSPITAL | Age: 35
Discharge: HOME | End: 2025-02-06
Attending: STUDENT IN AN ORGANIZED HEALTH CARE EDUCATION/TRAINING PROGRAM
Payer: MEDICAID

## 2025-02-05 VITALS
BODY MASS INDEX: 21.51 KG/M2 | HEART RATE: 93 BPM | DIASTOLIC BLOOD PRESSURE: 74 MMHG | TEMPERATURE: 98 F | RESPIRATION RATE: 18 BRPM | WEIGHT: 173 LBS | OXYGEN SATURATION: 97 % | SYSTOLIC BLOOD PRESSURE: 127 MMHG | HEIGHT: 75 IN

## 2025-02-05 DIAGNOSIS — M25.511 RIGHT SHOULDER PAIN: ICD-10-CM

## 2025-02-05 PROCEDURE — 99284 EMERGENCY DEPT VISIT MOD MDM: CPT | Mod: 25

## 2025-02-05 RX ORDER — KETOROLAC TROMETHAMINE 30 MG/ML
60 INJECTION, SOLUTION INTRAMUSCULAR; INTRAVENOUS
Status: DISCONTINUED | OUTPATIENT
Start: 2025-02-05 | End: 2025-02-06

## 2025-02-05 NOTE — Clinical Note
"Brian Dillard (Dwayne)glenn was seen and treated in our emergency department on 2/5/2025.  He may return to work on 02/08/2025.       If you have any questions or concerns, please don't hesitate to call.       RN    "

## 2025-02-06 PROCEDURE — 96372 THER/PROPH/DIAG INJ SC/IM: CPT | Performed by: STUDENT IN AN ORGANIZED HEALTH CARE EDUCATION/TRAINING PROGRAM

## 2025-02-06 PROCEDURE — 63600175 PHARM REV CODE 636 W HCPCS: Mod: JZ,TB | Performed by: STUDENT IN AN ORGANIZED HEALTH CARE EDUCATION/TRAINING PROGRAM

## 2025-02-06 PROCEDURE — 25000003 PHARM REV CODE 250: Performed by: STUDENT IN AN ORGANIZED HEALTH CARE EDUCATION/TRAINING PROGRAM

## 2025-02-06 RX ORDER — IBUPROFEN 600 MG/1
600 TABLET ORAL EVERY 6 HOURS PRN
Qty: 20 TABLET | Refills: 0 | Status: SHIPPED | OUTPATIENT
Start: 2025-02-06

## 2025-02-06 RX ORDER — METHOCARBAMOL 500 MG/1
1000 TABLET, FILM COATED ORAL 3 TIMES DAILY
Qty: 30 TABLET | Refills: 0 | Status: SHIPPED | OUTPATIENT
Start: 2025-02-06 | End: 2025-02-11

## 2025-02-06 RX ORDER — KETOROLAC TROMETHAMINE 30 MG/ML
30 INJECTION, SOLUTION INTRAMUSCULAR; INTRAVENOUS
Status: COMPLETED | OUTPATIENT
Start: 2025-02-06 | End: 2025-02-06

## 2025-02-06 RX ORDER — METHOCARBAMOL 500 MG/1
1000 TABLET, FILM COATED ORAL
Status: COMPLETED | OUTPATIENT
Start: 2025-02-06 | End: 2025-02-06

## 2025-02-06 RX ORDER — ONDANSETRON 4 MG/1
4 TABLET, ORALLY DISINTEGRATING ORAL EVERY 6 HOURS PRN
Qty: 12 TABLET | Refills: 0 | Status: SHIPPED | OUTPATIENT
Start: 2025-02-06

## 2025-02-06 RX ADMIN — METHOCARBAMOL 1000 MG: 500 TABLET ORAL at 01:02

## 2025-02-06 RX ADMIN — KETOROLAC TROMETHAMINE 30 MG: 30 INJECTION, SOLUTION INTRAMUSCULAR at 01:02

## 2025-02-06 NOTE — ED PROVIDER NOTES
Encounter Date: 2/5/2025       History     Chief Complaint   Patient presents with    Shoulder Pain     Reports heavy lifting yesterday and woke up this am with right shoulder pain. Full ROM to RUE. CSM intact. No OTC meds PTA.      Brian Peacock Jr. is a 34 y.o. male with a past medical history of none who presents to the ED for nontraumatic right shoulder pain after repetitive heavy lifting at work.  He denies any fall or trauma.  He denies any numbness, tingling, weakness.         Review of patient's allergies indicates:  No Known Allergies  No past medical history on file.  Past Surgical History:   Procedure Laterality Date    LAPAROSCOPIC APPENDECTOMY N/A 6/26/2024    Procedure: APPENDECTOMY, LAPAROSCOPIC;  Surgeon: Niko Padilla MD;  Location: [facility];  Service: General;  Laterality: N/A;     No family history on file.  Social History     Tobacco Use    Smoking status: Every Day     Types: Cigarettes    Smokeless tobacco: Never   Substance Use Topics    Alcohol use: Yes    Drug use: Yes     Types: Marijuana     Review of Systems   Constitutional:  Negative for fever.   HENT:  Negative for sore throat.    Respiratory:  Negative for shortness of breath.    Cardiovascular:  Negative for chest pain.   Gastrointestinal:  Negative for nausea.   Genitourinary:  Negative for dysuria.   Musculoskeletal:  Positive for arthralgias. Negative for back pain.   Skin:  Negative for rash.   Neurological:  Negative for weakness.   Hematological:  Does not bruise/bleed easily.       Physical Exam     Initial Vitals [02/05/25 2008]   BP Pulse Resp Temp SpO2   127/74 93 18 98.2 °F (36.8 °C) 97 %      MAP       --         Physical Exam    Nursing note and vitals reviewed.  Constitutional: He appears well-developed.   Eyes: EOM are normal. Pupils are equal, round, and reactive to light.   Cardiovascular:  Normal rate and regular rhythm.           No murmur heard.  Pulmonary/Chest: Breath sounds normal. No  respiratory distress. He has no wheezes. He has no rales.   Abdominal: Abdomen is soft. He exhibits no distension. There is no abdominal tenderness.   Musculoskeletal:         General: Tenderness (Right shoulder) present. No edema. Normal range of motion.     Skin: Skin is warm. Capillary refill takes less than 2 seconds. No rash noted.         ED Course   Procedures  Labs Reviewed - No data to display       Imaging Results              X-Ray Shoulder Complete 2 View Right (Final result)  Result time 02/05/25 21:21:16      Final result by Todd Bonilla MD (02/05/25 21:21:16)                   Impression:      No osseous abnormality identified.      Electronically signed by: Todd Bonilla  Date:    02/05/2025  Time:    21:21               Narrative:    EXAMINATION:  XR SHOULDER COMPLETE 2 OR MORE VIEWS RIGHT    CLINICAL HISTORY:  Pain in right shoulder    TECHNIQUE:  Three views.    COMPARISON:  None available.    FINDINGS:  The osseous and articular surfaces are unremarkable.  There is no acute fracture, dislocation or arthritic change.  Alignment and position are unremarkable.  There is unremarkable mineralization of the bones.  No soft tissue calcifications identified.                                       Medications   ketorolac injection 30 mg (30 mg Intramuscular Given 2/6/25 0134)   methocarbamoL tablet 1,000 mg (1,000 mg Oral Given 2/6/25 0134)     Medical Decision Making  Problems Addressed:  Right shoulder pain: acute illness or injury    Amount and/or Complexity of Data Reviewed  Radiology:  Decision-making details documented in ED Course.    Risk  Prescription drug management.      Differential diagnosis (includes but is not limited to):   Musculoskeletal pain, fracture, dislocation, ligamentous injury, tendon injury, neurovascular injury, soft tissue injury    MDM Narrative  34-year-old male presents for evaluation of nontraumatic right shoulder pain that occurred after multiple days of heavy lifting  while at work.  He states he has not taken anything for pain prior to arrival.  Pain medications given with significant improvement in symptoms.  X-ray unremarkable.  Sling provided for comfort.  Need for follow up with PCP/orthopedic surgery discussed the patient has verbalized understanding.  Strict return precautions discussed and patient verbalized understanding.  Patient states he is ready to go home.  Prescriptions for symptom control provided.    Dispo: Discharge    My independent radiology interpretation: as above  Point of care US (independently performed and interpreted):   Decision rules/clinical scoring:     Sepsis Perfusion Assessment:     Amount and/or Complexity of Data Reviewed  Independent historian: none   Summary of history:   External data reviewed: notes from previous ED visits and notes from clinic visits  Summary of data reviewed: Prior records reviewed  Risk and benefits of testing: discussed   Radiology: ordered and independent interpretation performed (see above or ED course)  ECG/medicine tests: ordered and independent interpretation performed (see above or ED course)  Discussion of management or test interpretation with external provider(s): none   Summary of discussion:     Risk  OTC medications  Prescription drug management   Shared decision making     Critical Care  none    Data Reviewed/Counseling: I have personally reviewed the patient's vital signs, nursing notes, and other relevant tests, information, and imaging. I had a detailed discussion regarding the historical points, exam findings, and any diagnostic results supporting the discharge diagnosis. I personally performed the history, PE, MDM and procedures as documented above and agree with the scribe's documentation.    Portions of this note were dictated using voice recognition software. Although it was reviewed for accuracy, some inherent voice recognition errors may have occurred and may be present in this document.              ED Course as of 02/15/25 0738   Thu 2025   0146 X-Ray Shoulder Complete 2 View Right  Independently visualized/reviewed by me during the ED visit.  - No acute fracture or dislocation []   0146 I have reassessed the patient.  Patient is resting comfortably, no acute distress.  Vital signs stable.  Discussed all results including incidental findings.  Discussed need for follow up and discussed return precautions.  Answered all questions at this time.  Hemodynamically stable for continued outpatient management. Patient verbalized understanding and agreed to plan.    [MC]      ED Course User Index  [MC] Yves Franco MD                           Clinical Impression:  Final diagnoses:  [M25.511] Right shoulder pain          ED Disposition Condition    Discharge Stable          ED Prescriptions       Medication Sig Dispense Start Date End Date Auth. Provider    ibuprofen (ADVIL,MOTRIN) 600 MG tablet Take 1 tablet (600 mg total) by mouth every 6 (six) hours as needed for Pain. 20 tablet 2025 -- Yves Franco MD    ondansetron (ZOFRAN-ODT) 4 MG TbDL Take 1 tablet (4 mg total) by mouth every 6 (six) hours as needed (Nausea). 12 tablet 2025 -- Yves Franco MD    methocarbamoL (ROBAXIN) 500 MG Tab () Take 2 tablets (1,000 mg total) by mouth 3 (three) times daily. for 5 days 30 tablet 2025 Yves Franco MD          Follow-up Information       Follow up With Specialties Details Why Contact Info    Your PCP  Schedule an appointment as soon as possible for a visit in 2 days      [facility]  Schedule an appointment as soon as possible for a visit   [address] [facility] [address]  [phone]      [facility] - Emergency Dept Emergency Medicine  If symptoms worsen [address] [facility] [facility] [phone]             Yves Franco MD  02/15/25 0738

## 2025-02-06 NOTE — FIRST PROVIDER EVALUATION
"Medical screening examination initiated.  I have conducted a focused provider triage encounter, findings are as follows:    Brief history of present illness:  34-year-old male presents to the emergency department with complaints right shoulder pain.  He does report heavy lifting yesterday.    Vitals:    02/05/25 2008   BP: 127/74   BP Location: Left arm   Pulse: 93   Resp: 18   Temp: 98.2 °F (36.8 °C)   TempSrc: Oral   SpO2: 97%   Weight: 78.5 kg (173 lb)   Height: 6' 3" (1.905 m)       Pertinent physical exam:  Awake and alert, NAD    Brief workup plan:  Imaging, meds    Preliminary workup initiated; this workup will be continued and followed by the physician or advanced practice provider that is assigned to the patient when roomed.  "

## 2025-02-06 NOTE — DISCHARGE INSTRUCTIONS

## (undated) DEVICE — KIT LAPAROSCOPY UNIVERSITY

## (undated) DEVICE — GLOVE SIGNATURE ESSNTL LTX 6

## (undated) DEVICE — MANIFOLD 4 PORT

## (undated) DEVICE — IRRIGATOR SUCTION W/TIP

## (undated) DEVICE — STAPLER INT LINEAR ARTC 3.5-45

## (undated) DEVICE — STAPLER ECHELON FLEX GST 45MM

## (undated) DEVICE — TROCAR LAPSCP XCEL 12MM 10CM

## (undated) DEVICE — RELOAD ECHELON ENDOPATH 45MM

## (undated) DEVICE — SYR 10CC LUER LOCK

## (undated) DEVICE — ADHESIVE DERMABOND ADVANCED

## (undated) DEVICE — BAG TISS RETRV MONARCH 10MM

## (undated) DEVICE — GLOVE SENSICARE PI GRN 6.5

## (undated) DEVICE — GLOVE SIGNATURE MICRO LTX 5.5

## (undated) DEVICE — SUT 0 VICRYL / UR6 (J603)

## (undated) DEVICE — SPONGE GAUZE 16PLY 4X4

## (undated) DEVICE — NDL HYPO REG 25G X 1 1/2

## (undated) DEVICE — APPLICATOR CHLORAPREP ORN 26ML

## (undated) DEVICE — SEALER LIGASURE LAP 37CM 5MM

## (undated) DEVICE — CANNULA ENDOPATH XCEL 5X100MM

## (undated) DEVICE — SPONGE LAP 18X18 PREWASHED

## (undated) DEVICE — GOWN SMARTSLEEVE AAMI LVL4 LG

## (undated) DEVICE — NDL GRANEE OPEN LOOP GRASPER

## (undated) DEVICE — GLOVE SIGNATURE MICRO LTX 7.5

## (undated) DEVICE — KIT SURGICAL TURNOVER

## (undated) DEVICE — SUT MCRYL PLUS 4-0 PS2 27IN

## (undated) DEVICE — CART STAPLE RELD 45MM WHT

## (undated) DEVICE — TROCAR ENDOPATH XCEL 5X100MM

## (undated) DEVICE — SOL IRRI STRL WATER 1000ML

## (undated) DEVICE — TUBING INSUFFLATOR W/ROT CONCT